# Patient Record
Sex: MALE | Race: WHITE | URBAN - METROPOLITAN AREA
[De-identification: names, ages, dates, MRNs, and addresses within clinical notes are randomized per-mention and may not be internally consistent; named-entity substitution may affect disease eponyms.]

---

## 2022-08-31 ENCOUNTER — HOSPITAL ENCOUNTER (OUTPATIENT)
Dept: INFUSION THERAPY | Age: 72
Discharge: HOME | End: 2022-08-31
Payer: COMMERCIAL

## 2022-08-31 ENCOUNTER — INITIAL CONSULT (OUTPATIENT)
Dept: ONCOLOGY | Age: 72
End: 2022-08-31
Payer: COMMERCIAL

## 2022-08-31 VITALS
TEMPERATURE: 97.8 F | HEIGHT: 72 IN | WEIGHT: 191.8 LBS | SYSTOLIC BLOOD PRESSURE: 130 MMHG | HEART RATE: 74 BPM | DIASTOLIC BLOOD PRESSURE: 75 MMHG | BODY MASS INDEX: 25.98 KG/M2 | RESPIRATION RATE: 16 BRPM | OXYGEN SATURATION: 97 %

## 2022-08-31 DIAGNOSIS — C61 PROSTATE CANCER METASTATIC TO BONE (HCC): ICD-10-CM

## 2022-08-31 DIAGNOSIS — C79.51 PROSTATE CANCER METASTATIC TO BONE (HCC): ICD-10-CM

## 2022-08-31 PROCEDURE — 99205 OFFICE O/P NEW HI 60 MIN: CPT | Performed by: INTERNAL MEDICINE

## 2022-08-31 PROCEDURE — 1123F ACP DISCUSS/DSCN MKR DOCD: CPT | Performed by: INTERNAL MEDICINE

## 2022-08-31 PROCEDURE — 99211 OFF/OP EST MAY X REQ PHY/QHP: CPT

## 2022-08-31 RX ORDER — AMLODIPINE BESYLATE 10 MG/1
10 TABLET ORAL DAILY
COMMUNITY
Start: 2022-02-18

## 2022-08-31 RX ORDER — LORATADINE 10 MG/1
10 TABLET ORAL DAILY
COMMUNITY
Start: 2022-03-18

## 2022-08-31 RX ORDER — FLUTICASONE PROPIONATE AND SALMETEROL 250; 50 UG/1; UG/1
POWDER RESPIRATORY (INHALATION)
COMMUNITY
Start: 2022-08-19

## 2022-08-31 RX ORDER — TIZANIDINE 4 MG/1
TABLET ORAL
COMMUNITY
Start: 2022-05-23

## 2022-08-31 RX ORDER — BICALUTAMIDE 50 MG/1
TABLET, FILM COATED ORAL
COMMUNITY
Start: 2022-08-19

## 2022-08-31 RX ORDER — PRENATAL VIT 91/IRON/FOLIC/DHA 28-975-200
COMBINATION PACKAGE (EA) ORAL
COMMUNITY
Start: 2022-07-20

## 2022-08-31 RX ORDER — FINASTERIDE 5 MG/1
TABLET, FILM COATED ORAL
COMMUNITY
Start: 2022-08-19

## 2022-08-31 RX ORDER — ACETAMINOPHEN 500 MG
1000 TABLET ORAL EVERY 6 HOURS PRN
COMMUNITY
Start: 2015-07-12

## 2022-08-31 RX ORDER — METOPROLOL TARTRATE 50 MG/1
50 TABLET, FILM COATED ORAL 2 TIMES DAILY
COMMUNITY
Start: 2015-07-20

## 2022-08-31 RX ORDER — TERBINAFINE HYDROCHLORIDE 1 G/100G
CREAM TOPICAL
COMMUNITY
Start: 2022-07-20

## 2022-08-31 RX ORDER — TAMSULOSIN HYDROCHLORIDE 0.4 MG/1
CAPSULE ORAL
COMMUNITY
Start: 2022-07-20

## 2022-08-31 RX ORDER — ATORVASTATIN CALCIUM 40 MG/1
TABLET, FILM COATED ORAL
COMMUNITY
Start: 2022-08-19

## 2022-08-31 RX ORDER — NYSTATIN 100000 [USP'U]/G
POWDER TOPICAL
COMMUNITY
Start: 2022-07-20

## 2022-08-31 RX ORDER — ALBUTEROL SULFATE 90 UG/1
AEROSOL, METERED RESPIRATORY (INHALATION)
COMMUNITY
Start: 2022-07-20

## 2022-08-31 ASSESSMENT — PATIENT HEALTH QUESTIONNAIRE - PHQ9
2. FEELING DOWN, DEPRESSED OR HOPELESS: 0
3. TROUBLE FALLING OR STAYING ASLEEP: 0
7. TROUBLE CONCENTRATING ON THINGS, SUCH AS READING THE NEWSPAPER OR WATCHING TELEVISION: 0
SUM OF ALL RESPONSES TO PHQ9 QUESTIONS 1 & 2: 0
4. FEELING TIRED OR HAVING LITTLE ENERGY: 0
9. THOUGHTS THAT YOU WOULD BE BETTER OFF DEAD, OR OF HURTING YOURSELF: 0
SUM OF ALL RESPONSES TO PHQ QUESTIONS 1-9: 0
SUM OF ALL RESPONSES TO PHQ QUESTIONS 1-9: 0
1. LITTLE INTEREST OR PLEASURE IN DOING THINGS: 0
8. MOVING OR SPEAKING SO SLOWLY THAT OTHER PEOPLE COULD HAVE NOTICED. OR THE OPPOSITE, BEING SO FIGETY OR RESTLESS THAT YOU HAVE BEEN MOVING AROUND A LOT MORE THAN USUAL: 0
SUM OF ALL RESPONSES TO PHQ QUESTIONS 1-9: 0
5. POOR APPETITE OR OVEREATING: 0
SUM OF ALL RESPONSES TO PHQ QUESTIONS 1-9: 0
6. FEELING BAD ABOUT YOURSELF - OR THAT YOU ARE A FAILURE OR HAVE LET YOURSELF OR YOUR FAMILY DOWN: 0
10. IF YOU CHECKED OFF ANY PROBLEMS, HOW DIFFICULT HAVE THESE PROBLEMS MADE IT FOR YOU TO DO YOUR WORK, TAKE CARE OF THINGS AT HOME, OR GET ALONG WITH OTHER PEOPLE: 0

## 2022-08-31 NOTE — PROGRESS NOTES
MA Rooming Questions  Patient: Caryn Duke  MRN: [id number]    Date: 8/31/2022      New patient          PHQ-9 Total Score: 0 (8/31/2022  1:44 PM)  Thoughts that you would be better off dead, or of hurting yourself in some way: Not at all (8/31/2022  1:44 PM)       PHQ-9 Given to (if applicable):               PHQ-9 Score (if applicable):                     [] Positive     []  Negative              Does question #9 need addressed (if applicable)                     [] Yes    []  No               Mariaelena Garcia CMA

## 2022-08-31 NOTE — PROGRESS NOTES
Patient Name:  Starla Long  Patient :  1950  Patient MRN:  [id number]     Primary Oncologist: Michael Ha MD  Referring Provider: Teo Cuadra MD     Date of Service: 2022      Reason for Consult: To evaluate the patient with metastatic castration sensitive prostate cancer. Chief Complaint:    Chief Complaint   Patient presents with    New Patient     Patient's active problem list:        Metastatic prostate cancer    HPI:   Starla Long is a 44-year-old very pleasant gentleman with medical history significant for hypertension, hyperlipidemia, COPD, GERD, osteoarthritis and history of kidney stone, referred to me on 2022 for evaluation of metastatic prostate cancer. He initially presented to [facility] on 22 with abdominal pain and PVR was about 9 liter. CT scan on 22 showed cystitis, b/l moderate to severe HN, b/l renal stones. All the renal stones are non obstructive and his ARF resolved with koch catheter. He was also noted to have significant elevation in PSA (331 ng/ml on 22). It was 150 on 2022 and 5 on 2019. RACHEL at that time showed rock hard large prostate. MRI pelvis showed diffuse tumor involvement of the prostate gland, with loss of zonal anatomy, extraprostatic extension and involvement of the seminal vesicles and suspected involvement of the neurovascular bundles, PI-RADS 5. Enlarged pelvic and retroperitoneal lymph nodes consistent with willow metastasis. A larger right common iliac chain lymph node results in mass-effect on the right common iliac vein. Early mucosa enhancement of the urinary bladder. Differential includes a cystitis although involvement of the urinary bladder is not excluded given extensive involvement of the prostate gland. He has right lower extremity swelling. Doppler study was negative for DVT and Echo showed normal EF. RLE swelling is most likely due to mass effect from enlarged lymph node to right common iliac vein. Bone scan done on 8/25/2022 showed findings concerning for metastatic disease within the T10 vertebral body. Findings consistent with degenerative changes in the spine and multiple joints and possibly an old fracture in the left rib cage. He is scheduled for TURP to get tissue diagnosis and to release urine outflow obstruction on 10/13/22. Dr. Jossue Kathleen started him on proscar and casodex on 8/19/22. He was referred to me for evaluation. Past Medical History:     Significant for  1. Hypertension  2. Hyperlipidemia  3. COPD  4. GERD  5. Osteoarthritis  6. History of kidney stone    Past Surgery History:    Significant for  1. Laparoscopic cholecystectomy  2. Right leg surgery  3. Tonsillectomy                                                                          4.  Endoscopy with dilatation    Social History:   He is a former smoker and he quit smoking in 7/10/2015. He used to smoke 2 packs a day for approximately 45 years. He denies alcohol drinking or illicit drug abuse. Family History:    Significant for COPD and CHF in his mother.     No Known Allergies    Current Outpatient Medications on File Prior to Visit   Medication Sig Dispense Refill    acetaminophen (TYLENOL) 500 MG tablet Take 1,000 mg by mouth every 6 hours as needed      bicalutamide (CASODEX) 50 MG chemo tablet TAKE 1 TABLET BY MOUTH EVERY DAY      amLODIPine (NORVASC) 10 MG tablet Take 10 mg by mouth daily      atorvastatin (LIPITOR) 40 MG tablet TAKE 1 TABLET BY MOUTH EVERY DAY      metoprolol tartrate (LOPRESSOR) 50 MG tablet Take 50 mg by mouth 2 times daily      bisacodyl (DULCOLAX) 5 MG EC tablet Take 10 mg by mouth daily as needed      finasteride (PROSCAR) 5 MG tablet TAKE 1 TABLET BY MOUTH EVERY DAY      tamsulosin (FLOMAX) 0.4 MG capsule TAKE 1 CAPSULE BY MOUTH EVERY DAY      tiZANidine (ZANAFLEX) 4 MG tablet TAKE 1 TABLET BY MOUTH TWICE A DAY      albuterol sulfate HFA (PROVENTIL;VENTOLIN;PROAIR) 108 (90 Base) MCG/ACT inhaler INHALE 2 PUFFS AS DIRECTED EVERY 6 HOURS AS NEEDED FOR SHORTNESS OF BREATH      WIXELA INHUB 250-50 MCG/ACT AEPB diskus inhaler INHALE 1 PUFF AS DIRECTED TWO TIMES A DAY      loratadine (CLARITIN) 10 MG tablet Take 10 mg by mouth daily      hydrocortisone 2.5 % cream Apply topically      nystatin (MYCOSTATIN) 640083 UNIT/GM powder APPLY TOPICALLY 2 TIMES A DAY FOR 13 DAYS. [employer] ATHLETES FOOT 1 % cream APPLY TO AFFECTED AREA TWICE A DAY FOR 14 DAYS      terbinafine (LAMISIL) 1 % cream APPLY TO AFFECTED AREA TWICE A DAY FOR 14 DAYS       No current facility-administered medications on file prior to visit. Review of Systems:  Constitutional:  No weight loss, No fever, No chills, No night sweats. Energy level is okay. Eyes:  No diplopia, No transient or permanent loss of vision, No scotomata. ENT / Mouth:  No epistaxis, No dysphagia, No hoarseness, No oral ulcers, No gingival bleeding. No sore throat, No postnasal drip, No nasal drip, No mouth pain, No sinus pain, No tinnitus, Normal hearing. Cardiovascular:  No chest pain, No palpitations, No syncope, No upper extremity edema, No lower extremity edema, No calf discomfort. Respiratory:  No cough. No hemoptysis, No pleurisy, No wheezing, No dyspnea. Gastrointestinal:  No abdominal pain, No abdominal cramping, No nausea, No vomiting, No constipation, No diarrhea, No hematochezia, No melena, No jaundice, No dyspepsia, No dysphagia. Urinary:  No dysuria, No hematuria, No urinary incontinence. Musculoskeletal:  No muscle pain, No swollen joints, No joint redness, Has back pain,  Skin:  No rash, No nodules, No pruritus, No lesions. Neurologic:  No confusion, No seizures, No syncope, No tremor, No speech change, No headache, No hiccups, No abnormal gait, No sensory changes, No weakness.   Psychiatric:  No depression, No anxiety, Concentration normal.  Endocrine:  No polyuria, No polydipsia, No hot flashes, No thyroid symptoms. Hematologic:  No epistaxis, No gingival bleeding, No petechiae, No ecchymosis. Lymphatic:  RLL lymphedema +. Allergy / Immunologic:  No eczema, No frequent mucous infections, No frequent respiratory infections, No recurrent urticarial, No frequent skin infections. Vital Signs: /75 (Site: Left Upper Arm, Position: Sitting, Cuff Size: Medium Adult)   Pulse 74   Temp 97.8 °F (36.6 °C) (Infrared)   Resp 16   Ht 6' (1.829 m)   Wt 191 lb 12.8 oz (87 kg)   SpO2 97%   BMI 26.01 kg/m²      Physical Exam:  CONSTITUTIONAL: awake, alert, cooperative, no apparent distress   EYES: pupils equal, round and reactive to light, sclera clear, normal conjunctiva  ENT: Normocephalic, without obvious abnormality, atraumatic  NECK: supple, symmetrical, no jugular venous distension, no carotid bruits   HEMATOLOGIC/LYMPHATIC: no cervical, supraclavicular or axillary lymphadenopathy   LUNGS: VBS, no wheezes, no increased work of breathing, no rhonchi, clear to auscultation, no crackles,    CARDIOVASCULAR: regular rate and rhythm, normal S1 and S2, no murmur noted  ABDOMEN: normal bowel sounds x 4, soft, non-distended, non-tender, no masses palpated, no hepatosplenomegaly   MUSCULOSKELETAL: full range of motion noted, tone is normal  NEUROLOGIC: awake, alert, oriented to name, place and time. Motor skills grossly intact. SKIN: appears intact, normal skin color, normal texture, normal turgor, no jaundice.    EXTREMITIES: RLE swelling +, no cyanosis, no clubbing,       Labs:  Hematology:  No results found for: WBC, RBC, HGB, HCT, MCV, MCH, MCHC, RDW, PLT, MPV, BANDSPCT, SEGSPCT, EOSRELPCT, BASOPCT, LYMPHOPCT, MONOPCT, BANDABS, SEGSABS, EOSABS, BASOSABS, LYMPHSABS, MONOSABS, DIFFTYPE, ANISOCYTOSIS, POLYCHROM, WBCMORP, PLTM  No results found for: ESR  Chemistry:  No results found for: NA, K, CL, CO2, BUN, CREATININE, GLUCOSE, CALCIUM, PROT, LABALBU, BILITOT, ALKPHOS, AST, ALT, LABGLOM, GFRAA, AGRATIO, GLOB, PHOS, MG, POCCA, POCGLU  No results found for: MMA, LDH, HOMOCYSTEINE  No components found for: LD  No results found for: TSHHS, T4FREE, FT3  Immunology:  No results found for: PROT, SPEP, ALBUMINELP, LABALPH, LABBETA, GAMGLOB  No results found for: Monica [place], KLFLCR  No results found for: B2M  Coagulation Panel:  No results found for: PROTIME, INR, APTT, DDIMER  Anemia Panel:  No results found for: [id number], FOLATE  Tumor Markers:  No results found for: , CEA, , LABCA2, PSA     Observations:  PHQ-9 Total Score: 0 (8/31/2022  1:44 PM)  Thoughts that you would be better off dead, or of hurting yourself in some way: Not at all (8/31/2022  1:44 PM)     Assessment   Castration naive metastatic prostate cancer    Plan:  Hayley Pisano is a 79-year-old very pleasant gentleman who initially presented to [facility] with urinary outlet obstruction. Further work up showed that he has rock hard large prostate on RACHEL with significantly elevated PSA (331). MRI showed multiple abdominal and pelvic lymphadenopathy. One of the right pelvic lymph node has mass effect of right common iliac vein and causing him significant right leg swelling. Bone scan showed metastatic disease in T10 vertebra body. He is scheduled for TURP to get tissue diagnosis and to release urine outflow obstruction on 10/13/22. Dr. Claus White started him on proscar and casodex on 8/19/22. I reviewed his scans (CT scans, MRI and bone scans) and labs. Clinically, he has castration naive metastatic prostate cancer. I discussed his case with Dr. Claus White briefly. I recommend him to have TURP as planned on 10/13/22. I discussed with him about management of castration naive metastatic prostate cancer, including   (A) ADT alone   (B) ADT with one of the following (Docetaxel; Abiraterone; Enzalutamide; or Apalutamide). (C) We also discussed about EBRT to primary tumor for low volume M1 disease.      I discussed with him about both surgical as well as chemical castration (ADT). He will discuss more about ADT with Dr. Elif Nguyen on his next office visit. I will see him back after biopsy and will plan to start either docetaxel or one of the oral drug at that time. I answered all his questions and concerns for today. I asked him to follow up with primary care physician on regular basis. I will continue to keep you updated on his progress. Thank you for allowing me to participate in the care of this very pleasant patient. Recent imaging and labs were reviewed and discussed with the patient.

## 2022-10-27 ENCOUNTER — OFFICE VISIT (OUTPATIENT)
Dept: ONCOLOGY | Age: 72
End: 2022-10-27
Payer: COMMERCIAL

## 2022-10-27 ENCOUNTER — HOSPITAL ENCOUNTER (OUTPATIENT)
Dept: INFUSION THERAPY | Age: 72
Discharge: HOME | End: 2022-10-27
Payer: COMMERCIAL

## 2022-10-27 VITALS
HEIGHT: 72 IN | DIASTOLIC BLOOD PRESSURE: 75 MMHG | WEIGHT: 198.8 LBS | HEART RATE: 91 BPM | TEMPERATURE: 97.6 F | SYSTOLIC BLOOD PRESSURE: 157 MMHG | OXYGEN SATURATION: 98 % | BODY MASS INDEX: 26.93 KG/M2

## 2022-10-27 DIAGNOSIS — C79.51 PROSTATE CANCER METASTATIC TO BONE (HCC): ICD-10-CM

## 2022-10-27 DIAGNOSIS — C61 PROSTATE CANCER METASTATIC TO BONE (HCC): ICD-10-CM

## 2022-10-27 DIAGNOSIS — C79.51 PROSTATE CANCER METASTATIC TO BONE (HCC): Primary | ICD-10-CM

## 2022-10-27 DIAGNOSIS — C61 PROSTATE CANCER METASTATIC TO BONE (HCC): Primary | ICD-10-CM

## 2022-10-27 LAB
ALBUMIN SERPL-MCNC: 4.1 GM/DL (ref 3.4–5)
ALP BLD-CCNC: 201 IU/L (ref 40–128)
ALT SERPL-CCNC: 11 U/L (ref 10–40)
ANION GAP SERPL CALCULATED.3IONS-SCNC: 12 MMOL/L (ref 4–16)
AST SERPL-CCNC: 12 IU/L (ref 15–37)
BASOPHILS ABSOLUTE: 0 K/CU MM
BASOPHILS RELATIVE PERCENT: 0.3 % (ref 0–1)
BILIRUB SERPL-MCNC: 0.6 MG/DL (ref 0–1)
BUN BLDV-MCNC: 15 MG/DL (ref 6–23)
CALCIUM SERPL-MCNC: 9.9 MG/DL (ref 8.3–10.6)
CHLORIDE BLD-SCNC: 104 MMOL/L (ref 99–110)
CO2: 23 MMOL/L (ref 21–32)
CREAT SERPL-MCNC: 0.9 MG/DL (ref 0.9–1.3)
DIFFERENTIAL TYPE: ABNORMAL
EOSINOPHILS ABSOLUTE: 0.2 K/CU MM
EOSINOPHILS RELATIVE PERCENT: 2.3 % (ref 0–3)
GFR SERPL CREATININE-BSD FRML MDRD: >60 ML/MIN/1.73M2
GLUCOSE BLD-MCNC: 106 MG/DL (ref 70–99)
HCT VFR BLD CALC: 42.6 % (ref 42–52)
HEMOGLOBIN: 13.9 GM/DL (ref 13.5–18)
LYMPHOCYTES ABSOLUTE: 1.3 K/CU MM
LYMPHOCYTES RELATIVE PERCENT: 17.3 % (ref 24–44)
MCH RBC QN AUTO: 27.5 PG (ref 27–31)
MCHC RBC AUTO-ENTMCNC: 32.6 % (ref 32–36)
MCV RBC AUTO: 84.4 FL (ref 78–100)
MONOCYTES ABSOLUTE: 0.8 K/CU MM
MONOCYTES RELATIVE PERCENT: 10.2 % (ref 0–4)
PDW BLD-RTO: 15.7 % (ref 11.7–14.9)
PLATELET # BLD: 255 K/CU MM (ref 140–440)
PMV BLD AUTO: 10.2 FL (ref 7.5–11.1)
POTASSIUM SERPL-SCNC: 4.2 MMOL/L (ref 3.5–5.1)
RBC # BLD: 5.05 M/CU MM (ref 4.6–6.2)
SEGMENTED NEUTROPHILS ABSOLUTE COUNT: 5.4 K/CU MM
SEGMENTED NEUTROPHILS RELATIVE PERCENT: 69.9 % (ref 36–66)
SODIUM BLD-SCNC: 139 MMOL/L (ref 135–145)
TOTAL PROTEIN: 6.5 GM/DL (ref 6.4–8.2)
WBC # BLD: 7.7 K/CU MM (ref 4–10.5)

## 2022-10-27 PROCEDURE — 85025 COMPLETE CBC W/AUTO DIFF WBC: CPT

## 2022-10-27 PROCEDURE — 84153 ASSAY OF PSA TOTAL: CPT

## 2022-10-27 PROCEDURE — 1123F ACP DISCUSS/DSCN MKR DOCD: CPT | Performed by: INTERNAL MEDICINE

## 2022-10-27 PROCEDURE — 84154 ASSAY OF PSA FREE: CPT

## 2022-10-27 PROCEDURE — 99215 OFFICE O/P EST HI 40 MIN: CPT | Performed by: INTERNAL MEDICINE

## 2022-10-27 PROCEDURE — 80053 COMPREHEN METABOLIC PANEL: CPT

## 2022-10-27 PROCEDURE — 36415 COLL VENOUS BLD VENIPUNCTURE: CPT

## 2022-10-27 PROCEDURE — 99211 OFF/OP EST MAY X REQ PHY/QHP: CPT

## 2022-10-27 NOTE — PROGRESS NOTES
MA Rooming Questions  Patient: Twila Daily  MRN: [id number]    Date: 10/27/2022        1. Do you have any new issues?   no         2. Do you need any refills on medications?    no    3. Have you had any imaging done since your last visit?   no    4. Have you been hospitalized or seen in the emergency room since your last visit here?   no    5. Did the patient have a depression screening completed today?  No    No data recorded     PHQ-9 Given to (if applicable):               PHQ-9 Score (if applicable):                     [] Positive     []  Negative              Does question #9 need addressed (if applicable)                     [] Yes    []  No               Clark Cisneros CMA

## 2022-10-27 NOTE — LETTER
[address] [place] [address] [address] [place] [facility] [address]  Phone: [phone]  Fax: [phone]           Jeff Guzman MD      October 28, 2022     Patient: Debra Shoulder   MR Number: [id number]   YOB: 1950   Date of Visit: 10/27/2022       Dear Dr. Felix Beams ref. provider found: Thank you for referring Saroj Taveras to me for evaluation/treatment. Below are the relevant portions of my assessment and plan of care. If you have questions, please do not hesitate to call me. I look forward to following Mainor Gonzalezs along with you.     Sincerely,        Jeff Guzman MD    CC providers:  MD Bernie Armstrong  [address].  [address] Valerio Henry MD  [address] [place] [id number]  Via Fax: [phone]

## 2022-10-27 NOTE — PROGRESS NOTES
Patient Name:  Colten Prather  Patient :  1950  Patient MRN:  [id number]     Primary Oncologist: Gely Olson MD  Referring Provider: Alfredito Duron MD     Date of Service: 10/27/2022     Chief Complaint:    Chief Complaint   Patient presents with    Follow-up     Patient's active problem list:        Metastatic prostate cancer    HPI:   Colten Prather is a 74-year-old very pleasant gentleman with medical history significant for hypertension, hyperlipidemia, COPD, GERD, osteoarthritis and history of kidney stone, referred to me on 2022 for evaluation of metastatic prostate cancer. He initially presented to [facility] on 22 with abdominal pain and PVR was about 9 liter. CT scan on 22 showed cystitis, b/l moderate to severe HN, b/l renal stones. All the renal stones are non obstructive and his ARF resolved with koch catheter. He was also noted to have significant elevation in PSA (331 ng/ml on 22). It was 150 on 2022 and 5 on 2019. RACHEL at that time showed rock hard large prostate. MRI pelvis showed diffuse tumor involvement of the prostate gland, with loss of zonal anatomy, extraprostatic extension and involvement of the seminal vesicles and suspected involvement of the neurovascular bundles, PI-RADS 5. Enlarged pelvic and retroperitoneal lymph nodes consistent with willow metastasis. A larger right common iliac chain lymph node results in mass-effect on the right common iliac vein. Early mucosa enhancement of the urinary bladder. Differential includes a cystitis although involvement of the urinary bladder is not excluded given extensive involvement of the prostate gland. He has right lower extremity swelling. Doppler study was negative for DVT and Echo showed normal EF. RLE swelling is most likely due to mass effect from enlarged lymph node to right common iliac vein.      Bone scan done on 2022 showed findings concerning for metastatic disease within the T10 vertebral body. Findings consistent with degenerative changes in the spine and multiple joints and possibly an old fracture in the left rib cage. He is scheduled for TURP to get tissue diagnosis and to release urine outflow obstruction on 10/13/22. Dr. Ramón Nicolas started him on proscar and casodex on 8/19/22. He underwent transurethral resection of prostate cancer on 10/13/22 and pathology showed prostate adenocarcinoma, fabi score 4+4=8, (grade group 4, 12/15 cores, involving approximately 30% of tissue present. On October 27, 2022, he presented to me for follow-up. On today's visit, I reviewed with him findings on final pathology. Clinically, he has castration sensitive prostate cancer, grade group 4 and I reviewed with him all the treatment options. I recommend ADT with docetaxel and abiraterone. Reviewed with him all the potential side effects as well as benefits of the therapy. After long discussion, he is in agreement with the plans. Will set up for chemo education and we will plan to start docetaxel and abiraterone in next few weeks. Discussed his case with Dr. Ramón Nicolas. Dr. Ramón Nicolas will initiate ADT soon. I will continue to follow him closely during chemotherapy and abiraterone. He stated that he is feeling some improvement after TURP. He doesn't have any new complaints. Past Medical History:     Significant for  1. Hypertension  2. Hyperlipidemia  3. COPD  4. GERD  5. Osteoarthritis  6. History of kidney stone    Past Surgery History:    Significant for  1. Laparoscopic cholecystectomy  2. Right leg surgery  3. Tonsillectomy                                                                          4.  Endoscopy with dilatation    Social History:   He is a former smoker and he quit smoking in 7/10/2015. He used to smoke 2 packs a day for approximately 45 years. He denies alcohol drinking or illicit drug abuse.     Family History:    Significant for COPD and CHF in his mother. No Known Allergies    Review of Systems: \"Per interval history; otherwise 10 point ROS is negative. \"  His energy level is pretty good and his sleep is fine. He doesn't have fever, chills, night sweats, cough, shortness of breath, chest pain, hemoptysis or palpitations. His bowel and bladder functions are normal. He doesn't have nausea, vomiting, abdominal pain, diarrhea, constipation, dysuria, loss of appetite or weight loss. He doesn't have neuropathy and he denies bleeding or clotting issues. He doesn't have any pain in his body. No anxiety or depression. The rest of the systems are unremarkable. Vital Signs: BP (!) 157/75 (Site: Right Upper Arm, Position: Sitting, Cuff Size: Medium Adult)   Pulse 91   Temp 97.6 °F (36.4 °C) (Infrared)   Ht 6' (1.829 m)   Wt 198 lb 12.8 oz (90.2 kg)   SpO2 98%   BMI 26.96 kg/m²      Physical Exam:  CONSTITUTIONAL: awake, alert, cooperative, no apparent distress   EYES: pupils equal, round and reactive to light, sclera clear, normal conjunctiva  ENT: Normocephalic, without obvious abnormality, atraumatic  NECK: supple, symmetrical, no jugular venous distension, no carotid bruits   HEMATOLOGIC/LYMPHATIC: no cervical, supraclavicular or axillary lymphadenopathy   LUNGS: VBS, no wheezes, no increased work of breathing, no rhonchi, clear to auscultation, no crackles,    CARDIOVASCULAR: regular rate and rhythm, normal S1 and S2, no murmur noted  ABDOMEN: normal bowel sounds x 4, soft, non-distended, non-tender, no masses palpated, no hepatosplenomegaly   MUSCULOSKELETAL: full range of motion noted, tone is normal  NEUROLOGIC: awake, alert, oriented to name, place and time. Motor skills grossly intact. SKIN: appears intact, normal skin color, normal texture, normal turgor, no jaundice.    EXTREMITIES: RLE swelling +, no clubbing, no cyanosis,        Labs:  Hematology:  Lab Results   Component Value Date    WBC 7.7 10/27/2022    RBC 5.05 10/27/2022 HGB 13.9 10/27/2022    HCT 42.6 10/27/2022    MCV 84.4 10/27/2022    MCH 27.5 10/27/2022    MCHC 32.6 10/27/2022    RDW 15.7 (H) 10/27/2022     10/27/2022    MPV 10.2 10/27/2022    SEGSPCT 69.9 (H) 10/27/2022    EOSRELPCT 2.3 10/27/2022    BASOPCT 0.3 10/27/2022    LYMPHOPCT 17.3 (L) 10/27/2022    MONOPCT 10.2 (H) 10/27/2022    SEGSABS 5.4 10/27/2022    EOSABS 0.2 10/27/2022    BASOSABS 0.0 10/27/2022    LYMPHSABS 1.3 10/27/2022    MONOSABS 0.8 10/27/2022    DIFFTYPE AUTOMATED DIFFERENTIAL 10/27/2022     No results found for: ESR  Chemistry:  Lab Results   Component Value Date     10/27/2022    K 4.2 10/27/2022     10/27/2022    CO2 23 10/27/2022    BUN 15 10/27/2022    CREATININE 0.9 10/27/2022    GLUCOSE 106 (H) 10/27/2022    CALCIUM 9.9 10/27/2022    PROT 6.5 10/27/2022    LABALBU 4.1 10/27/2022    BILITOT 0.6 10/27/2022    ALKPHOS 201 (H) 10/27/2022    AST 12 (L) 10/27/2022    ALT 11 10/27/2022    LABGLOM >60 10/27/2022     No results found for: MMA, LDH, HOMOCYSTEINE  No components found for: LD  No results found for: TSHHS, T4FREE, FT3  Immunology:  Lab Results   Component Value Date    PROT 6.5 10/27/2022     No results found for: VETO SalazarLCR  No results found for: B2M  Coagulation Panel:  No results found for: PROTIME, INR, APTT, DDIMER  Anemia Panel:  No results found for: [id number], FOLATE  Tumor Markers:  No results found for: , CEA, , LABCA2, PSA     Observations:  No data recorded     Assessment   Castration naive metastatic prostate cancer    Plan:  Cole Gomez is a 27-year-old very pleasant gentleman who initially presented to [facility] with urinary outlet obstruction. Further work up showed that he has rock hard large prostate on RACHEL with significantly elevated PSA (331). MRI showed multiple abdominal and pelvic lymphadenopathy.  One of the right pelvic lymph node has mass effect of right common iliac vein and causing him significant right leg swelling. Bone scan showed metastatic disease in T10 vertebra body. He is scheduled for TURP to get tissue diagnosis and to release urine outflow obstruction on 10/13/22. Dr. Donny Mckeon started him on proscar and casodex on 8/19/22. He underwent transurethral resection of prostate cancer on 10/13/22 and pathology showed prostate adenocarcinoma, fabi score 4+4=8, (grade group 4, 12/15 cores, involving approximately 30% of tissue present. On October 27, 2022, he presented to me for follow-up. On today's visit, I reviewed with him findings on final pathology. Clinically, he has castration sensitive prostate cancer, grade group 4 and I reviewed with him all the treatment options. I recommend ADT with docetaxel and abiraterone. Reviewed with him all the potential side effects as well as benefits of the therapy. After long discussion, he is in agreement with the plans. Will set up for chemo education and we will plan to start docetaxel and abiraterone in next few weeks. Discussed his case with Dr. Donny Mckeon. Dr. Donny Mckeon will initiate ADT soon. I will continue to follow him closely during chemotherapy and abiraterone. I answered all his questions and concerns for today. Recent imaging and labs were reviewed and discussed with the patient.

## 2022-10-28 DIAGNOSIS — C79.51 PROSTATE CANCER METASTATIC TO BONE (HCC): Primary | ICD-10-CM

## 2022-10-28 DIAGNOSIS — C61 PROSTATE CANCER METASTATIC TO BONE (HCC): Primary | ICD-10-CM

## 2022-10-28 LAB
PROSTATE SPECIFIC ANTIGEN FREE: 21.4 NG/ML
PROSTATE SPECIFIC ANTIGEN PERCENT FREE: 34 %
PROSTATE SPECIFIC ANTIGEN: 63.7 NG/ML (ref 0–4)

## 2022-10-28 RX ORDER — SODIUM CHLORIDE 9 MG/ML
5-250 INJECTION, SOLUTION INTRAVENOUS PRN
OUTPATIENT
Start: 2022-12-05

## 2022-10-28 RX ORDER — ABIRATERONE 500 MG/1
1000 TABLET ORAL DAILY
Qty: 30 TABLET | Refills: 5 | Status: ACTIVE | OUTPATIENT
Start: 2022-10-28 | End: 2022-11-01 | Stop reason: SDUPTHER

## 2022-10-28 RX ORDER — ALBUTEROL SULFATE 90 UG/1
4 AEROSOL, METERED RESPIRATORY (INHALATION) PRN
OUTPATIENT
Start: 2022-12-05

## 2022-10-28 RX ORDER — FAMOTIDINE 10 MG/ML
20 INJECTION, SOLUTION INTRAVENOUS
OUTPATIENT
Start: 2022-12-05

## 2022-10-28 RX ORDER — ACETAMINOPHEN 325 MG/1
650 TABLET ORAL
OUTPATIENT
Start: 2022-12-05

## 2022-10-28 RX ORDER — PREDNISONE 1 MG/1
5 TABLET ORAL 2 TIMES DAILY
Qty: 60 TABLET | Refills: 5 | Status: SHIPPED | OUTPATIENT
Start: 2022-10-28

## 2022-10-28 RX ORDER — EPINEPHRINE 1 MG/ML
0.3 INJECTION, SOLUTION, CONCENTRATE INTRAVENOUS PRN
OUTPATIENT
Start: 2022-12-05

## 2022-10-28 RX ORDER — SODIUM CHLORIDE 0.9 % (FLUSH) 0.9 %
5-40 SYRINGE (ML) INJECTION PRN
OUTPATIENT
Start: 2022-12-05

## 2022-10-28 RX ORDER — DIPHENHYDRAMINE HYDROCHLORIDE 50 MG/ML
50 INJECTION INTRAMUSCULAR; INTRAVENOUS
OUTPATIENT
Start: 2022-12-05

## 2022-10-28 RX ORDER — MEPERIDINE HYDROCHLORIDE 50 MG/ML
12.5 INJECTION INTRAMUSCULAR; INTRAVENOUS; SUBCUTANEOUS PRN
OUTPATIENT
Start: 2022-12-05

## 2022-10-28 RX ORDER — SODIUM CHLORIDE 9 MG/ML
5-40 INJECTION INTRAVENOUS PRN
OUTPATIENT
Start: 2022-12-05

## 2022-10-28 RX ORDER — SODIUM CHLORIDE 9 MG/ML
INJECTION, SOLUTION INTRAVENOUS CONTINUOUS
OUTPATIENT
Start: 2022-12-05

## 2022-10-28 RX ORDER — ONDANSETRON 2 MG/ML
8 INJECTION INTRAMUSCULAR; INTRAVENOUS
OUTPATIENT
Start: 2022-12-05

## 2022-10-28 RX ORDER — HEPARIN SODIUM (PORCINE) LOCK FLUSH IV SOLN 100 UNIT/ML 100 UNIT/ML
500 SOLUTION INTRAVENOUS PRN
OUTPATIENT
Start: 2022-12-05

## 2022-10-28 NOTE — PROGRESS NOTES
RX for zytiga 1,000 mg PO daily and prednisone 5 mg PO BID e-scribed to [address] [facility] [address]. NN referral placed per physician order.

## 2022-11-01 RX ORDER — ABIRATERONE 500 MG/1
1000 TABLET ORAL DAILY
Qty: 60 TABLET | Refills: 5 | Status: ACTIVE | OUTPATIENT
Start: 2022-11-01 | End: 2022-11-16

## 2022-11-01 NOTE — PROGRESS NOTES
[facility]    Date: 11/01/22    Medication is currently being filled at [facility] and has been routed there. Prior authorization effective through 10/31/23. Please call us with any questions at [phone] opt.  2.

## 2022-11-01 NOTE — PROGRESS NOTES
Received VM from [facility] regarding abiraterone (zytiga) 500 mg tablets prescription. QTY updated to 60 (patient to take 2 tablets PO daily). New RX e-scribed to pharmacy. Called [facility] @ [phone] to notify. [facility] voices understanding.

## 2022-11-02 ENCOUNTER — CLINICAL DOCUMENTATION (OUTPATIENT)
Dept: ONCOLOGY | Age: 72
End: 2022-11-02

## 2022-11-02 NOTE — PROGRESS NOTES
This nurse called the patient @ [phone] to follow up on delivery of medication (Zytiga) and to advise him once insurance approves, he will be scheduled for chemo education.  Patient verbalized undertanding

## 2022-11-16 ENCOUNTER — CLINICAL DOCUMENTATION (OUTPATIENT)
Dept: ONCOLOGY | Age: 72
End: 2022-11-16

## 2022-11-16 RX ORDER — ABIRATERONE 500 MG/1
1000 TABLET ORAL DAILY
Qty: 60 TABLET | Refills: 5 | Status: ACTIVE | OUTPATIENT
Start: 2022-11-16

## 2022-11-16 NOTE — PROGRESS NOTES
Routing to [facility] per chart notes. High copay, no external foundation. [facility] able to help with copay. PA good thru 10/31/23.

## 2022-11-16 NOTE — PROGRESS NOTES
This nurse received a return call from the patient. This nurse updated the patient on the delays with medication and that his Elida Seed was be sent to [facility] for possible fill. All the patient's questions were answered and he denies further needs at this time.

## 2022-11-16 NOTE — PROGRESS NOTES
This nurse spoke with [facility] who report they may have assistance for the patient. This nurse rerouted prescription to [facility] as well as faxed Face sheet, PA and latest progress noted to [phone]. This called the patient @ [phone] however there was no answer. The patient's mailbox is also full and there is no option to leave a VM.

## 2022-11-19 NOTE — PROGRESS NOTES
Patient Name:  Nehal Renae  Patient :  1950  Patient MRN:  [id number]     Primary Oncologist: Gianluca Mcfarland MD  Referring Provider: Jia Pritchett MD     Date of Service: 2022     Chief Complaint:    Chief Complaint   Patient presents with    Follow-up     Patient's active problem list:        Metastatic prostate cancer    HPI:   Nehal Renae is a 62-year-old very pleasant gentleman with medical history significant for hypertension, hyperlipidemia, COPD, GERD, osteoarthritis and history of kidney stone, referred to me on 2022 for evaluation of metastatic prostate cancer. He initially presented to [facility] on 22 with abdominal pain and PVR was about 9 liter. CT scan on 22 showed cystitis, b/l moderate to severe HN, b/l renal stones. All the renal stones are non obstructive and his ARF resolved with koch catheter. He was also noted to have significant elevation in PSA (331 ng/ml on 22). It was 150 on 2022 and 5 on 2019. RACHEL at that time showed rock hard large prostate. MRI pelvis showed diffuse tumor involvement of the prostate gland, with loss of zonal anatomy, extraprostatic extension and involvement of the seminal vesicles and suspected involvement of the neurovascular bundles, PI-RADS 5. Enlarged pelvic and retroperitoneal lymph nodes consistent with willow metastasis. A larger right common iliac chain lymph node results in mass-effect on the right common iliac vein. Early mucosa enhancement of the urinary bladder. Differential includes a cystitis although involvement of the urinary bladder is not excluded given extensive involvement of the prostate gland. He has right lower extremity swelling. Doppler study was negative for DVT and Echo showed normal EF. RLE swelling is most likely due to mass effect from enlarged lymph node to right common iliac vein.      Bone scan done on 2022 showed findings concerning for metastatic disease within the T10 vertebral body. Findings consistent with degenerative changes in the spine and multiple joints and possibly an old fracture in the left rib cage. He is scheduled for TURP to get tissue diagnosis and to release urine outflow obstruction on 10/13/22. Dr. Adela Donovan started him on proscar and casodex on 8/19/22. He underwent transurethral resection of prostate cancer on 10/13/22 and pathology showed prostate adenocarcinoma, fabi score 4+4=8, (grade group 4, 12/15 cores, involving approximately 30% of tissue present. Dr. Adela Donovan started ADT with eligard since early November 2022. On November 21, 2022, he presented to me for follow-up. I have been following him for prostate cancer. Clinically, he has castration sensitive prostate cancer, grade group 4 and I reviewed with him all the treatment options. I recommend ADT with docetaxel and abiraterone. Reviewed with him all the potential side effects as well as benefits of the therapy. After long discussion, he is in agreement with the plans. I discussed his case with Dr. Adela Donovan and Pat [place] was started early November 2022. We already received authorization for docetaxel and will plan to start it soon. We are still waiting to get approval for abiraterone. Will set up for chemo education and we will plan to start docetaxel soon. I will continue to follow him closely during chemotherapy. He stated that he is feeling some improvement after TURP and ADT. He doesn't have any new complaints on today visit. Past Medical History:     Significant for  1. Hypertension  2. Hyperlipidemia  3. COPD  4. GERD  5. Osteoarthritis  6. History of kidney stone    Past Surgery History:    Significant for  1. Laparoscopic cholecystectomy  2. Right leg surgery  3.   Tonsillectomy                                                                          4.  Endoscopy with dilatation    Social History:   He is a former smoker and he quit smoking in 7/10/2015. He used to smoke 2 packs a day for approximately 45 years. He denies alcohol drinking or illicit drug abuse. Family History:    Significant for COPD and CHF in his mother. No Known Allergies    Review of Systems: \"Per interval history; otherwise 10 point ROS is negative. \"  His energy level is better and his sleep is fine. He doesn't have fever, chills, night sweats, cough, shortness of breath, chest pain, hemoptysis or palpitations. His bowel and bladder functions are normal. He doesn't have nausea, vomiting, abdominal pain, diarrhea, constipation, dysuria, loss of appetite or weight loss. He doesn't have neuropathy and he denies bleeding or clotting issues. He denies any pain in his body. No anxiety or depression. The rest of the systems are unremarkable. Vital Signs: /79 (Site: Right Upper Arm, Position: Sitting, Cuff Size: Medium Adult)   Pulse 82   Temp 97.8 °F (36.6 °C) (Temporal)   Ht 6' (1.829 m)   Wt 192 lb 12.8 oz (87.5 kg)   SpO2 98%   BMI 26.15 kg/m²      Physical Exam:  CONSTITUTIONAL: awake, alert, cooperative, no apparent distress   EYES: pupils equal, round and reactive to light, sclera clear, normal conjunctiva  ENT: Normocephalic, without obvious abnormality, atraumatic  NECK: supple, symmetrical, no jugular venous distension, no carotid bruits   HEMATOLOGIC/LYMPHATIC: no cervical, supraclavicular or axillary lymphadenopathy   LUNGS: VBS, no wheezes, no increased work of breathing, no rhonchi, clear to auscultation, no crackles,    CARDIOVASCULAR: regular rate and rhythm, normal S1 and S2, no murmur noted  ABDOMEN: normal bowel sounds x 4, soft, non-distended, non-tender, no masses palpated, no hepatosplenomegaly   MUSCULOSKELETAL: full range of motion noted, tone is normal  NEUROLOGIC: awake, alert, oriented to name, place and time. Motor skills grossly intact. SKIN: appears intact, normal skin color, normal texture, normal turgor, no jaundice. EXTREMITIES: no clubbing, RLE swelling +, no cyanosis,        Labs:  Hematology:  Lab Results   Component Value Date    WBC 7.7 10/27/2022    RBC 5.05 10/27/2022    HGB 13.9 10/27/2022    HCT 42.6 10/27/2022    MCV 84.4 10/27/2022    MCH 27.5 10/27/2022    MCHC 32.6 10/27/2022    RDW 15.7 (H) 10/27/2022     10/27/2022    MPV 10.2 10/27/2022    SEGSPCT 69.9 (H) 10/27/2022    EOSRELPCT 2.3 10/27/2022    BASOPCT 0.3 10/27/2022    LYMPHOPCT 17.3 (L) 10/27/2022    MONOPCT 10.2 (H) 10/27/2022    SEGSABS 5.4 10/27/2022    EOSABS 0.2 10/27/2022    BASOSABS 0.0 10/27/2022    LYMPHSABS 1.3 10/27/2022    MONOSABS 0.8 10/27/2022    DIFFTYPE AUTOMATED DIFFERENTIAL 10/27/2022     No results found for: ESR  Chemistry:  Lab Results   Component Value Date     10/27/2022    K 4.2 10/27/2022     10/27/2022    CO2 23 10/27/2022    BUN 15 10/27/2022    CREATININE 0.9 10/27/2022    GLUCOSE 106 (H) 10/27/2022    CALCIUM 9.9 10/27/2022    PROT 6.5 10/27/2022    LABALBU 4.1 10/27/2022    BILITOT 0.6 10/27/2022    ALKPHOS 201 (H) 10/27/2022    AST 12 (L) 10/27/2022    ALT 11 10/27/2022    LABGLOM >60 10/27/2022     No results found for: MMA, LDH, HOMOCYSTEINE  No components found for: LD  No results found for: TSHHS, T4FREE, FT3  Immunology:  Lab Results   Component Value Date    PROT 6.5 10/27/2022     No results found for: Nataliia Laughter, KLFLCR  No results found for: B2M  Coagulation Panel:  No results found for: PROTIME, INR, APTT, DDIMER  Anemia Panel:  No results found for: WNQNPPLY25, FOLATE  Tumor Markers:  Lab Results   Component Value Date    PSA 63.7 (H) 10/27/2022        Observations:  PHQ-9 Total Score: 0 (11/21/2022  1:13 PM)     Assessment   Castration naive metastatic prostate cancer    Plan:  Pau Murray is a 29-year-old very pleasant gentleman who initially presented to [facility] with urinary outlet obstruction.      Further work up showed that he has rock hard large prostate on RACHEL with significantly elevated PSA (331). MRI showed multiple abdominal and pelvic lymphadenopathy. One of the right pelvic lymph node has mass effect of right common iliac vein and causing him significant right leg swelling. Bone scan showed metastatic disease in T10 vertebra body. He is scheduled for TURP to get tissue diagnosis and to release urine outflow obstruction on 10/13/22. Dr. Justen Harris started him on proscar and casodex on 8/19/22. He underwent transurethral resection of prostate cancer on 10/13/22 and pathology showed prostate adenocarcinoma, fabi score 4+4=8, (grade group 4, 12/15 cores, involving approximately 30% of tissue present. Dr. Justen Harris started ADT with eligard since early November 2022. On November 21, 2022, he presented to me for follow-up. I have been following him for prostate cancer. Clinically, he has castration sensitive prostate cancer, grade group 4 and I reviewed with him all the treatment options. I recommend ADT with docetaxel and abiraterone. Reviewed with him all the potential side effects as well as benefits of the therapy. After long discussion, he is in agreement with the plans. I discussed his case with Dr. Justen Harris and Flordia Haste was started early November 2022. We already received authorization for docetaxel and will plan to start it soon. We are still waiting to get approval for abiraterone. Will set up for chemo education and we will plan to start docetaxel soon. I will continue to follow him closely during chemotherapy. He stated that he is feeling some improvement after TURP and ADT. I answered all his questions and concerns for today. Recent imaging and labs were reviewed and discussed with the patient.

## 2022-11-21 ENCOUNTER — CLINICAL DOCUMENTATION (OUTPATIENT)
Dept: ONCOLOGY | Age: 72
End: 2022-11-21

## 2022-11-21 ENCOUNTER — OFFICE VISIT (OUTPATIENT)
Dept: ONCOLOGY | Age: 72
End: 2022-11-21
Payer: COMMERCIAL

## 2022-11-21 ENCOUNTER — HOSPITAL ENCOUNTER (OUTPATIENT)
Dept: INFUSION THERAPY | Age: 72
Discharge: HOME | End: 2022-11-21
Payer: COMMERCIAL

## 2022-11-21 VITALS
HEART RATE: 82 BPM | HEIGHT: 72 IN | DIASTOLIC BLOOD PRESSURE: 79 MMHG | BODY MASS INDEX: 26.11 KG/M2 | SYSTOLIC BLOOD PRESSURE: 120 MMHG | WEIGHT: 192.8 LBS | TEMPERATURE: 97.8 F | OXYGEN SATURATION: 98 %

## 2022-11-21 DIAGNOSIS — C79.51 PROSTATE CANCER METASTATIC TO BONE (HCC): Primary | ICD-10-CM

## 2022-11-21 DIAGNOSIS — C61 PROSTATE CANCER METASTATIC TO BONE (HCC): Primary | ICD-10-CM

## 2022-11-21 PROCEDURE — 99211 OFF/OP EST MAY X REQ PHY/QHP: CPT

## 2022-11-21 PROCEDURE — 1123F ACP DISCUSS/DSCN MKR DOCD: CPT | Performed by: INTERNAL MEDICINE

## 2022-11-21 PROCEDURE — 99214 OFFICE O/P EST MOD 30 MIN: CPT | Performed by: INTERNAL MEDICINE

## 2022-11-21 ASSESSMENT — PATIENT HEALTH QUESTIONNAIRE - PHQ9
SUM OF ALL RESPONSES TO PHQ QUESTIONS 1-9: 0
SUM OF ALL RESPONSES TO PHQ9 QUESTIONS 1 & 2: 0
SUM OF ALL RESPONSES TO PHQ QUESTIONS 1-9: 0
1. LITTLE INTEREST OR PLEASURE IN DOING THINGS: 0
2. FEELING DOWN, DEPRESSED OR HOPELESS: 0
SUM OF ALL RESPONSES TO PHQ QUESTIONS 1-9: 0
SUM OF ALL RESPONSES TO PHQ QUESTIONS 1-9: 0

## 2022-11-21 NOTE — PROGRESS NOTES
This nurse called [facility] @ [phone] and spoke to staff to have them reverse the claim for the Zytiga so it could be processed by [facility]. They informed this nruse that they would reverse the claim. This nurse called and left a VM for 0[address] @ [phone] x [phone] to inform her. This nurse has talked to Zeke[facility]gozi Delmi at [address] [place] [address] twice since speaking with [facility] this morning and [facility] is still unable to process the prescription due to \"refill too soon. \" This nurse has attempted to call [facility] back 5 times without an answer or option to leave a message. Will continue to attempt to contact them.

## 2022-11-21 NOTE — PROGRESS NOTES
MA Rooming Questions  Patient: Oneal Valdes  MRN: [id number]    Date: 11/21/2022        1. Do you have any new issues?   no         2. Do you need any refills on medications?    no    3. Have you had any imaging done since your last visit? yes - labs 10/28    4. Have you been hospitalized or seen in the emergency room since your last visit here?   no    5. Did the patient have a depression screening completed today?  Yes    PHQ-9 Total Score: 0 (11/21/2022  1:13 PM)       PHQ-9 Given to (if applicable):               PHQ-9 Score (if applicable):                     [] Positive     []  Negative              Does question #9 need addressed (if applicable)                     [] Yes    []  No               Abena Beltran CMA

## 2022-11-21 NOTE — LETTER
[address] [place] [address] [address] [place] [address]  Phone: [phone]  Fax: [phone]           Manfred Oseguera MD      November 21, 2022     Patient: Sina Ashraf   MR Number: [id number]   YOB: 1950   Date of Visit: 11/21/2022       Dear Dr. Tad Palacio ref. provider found: Thank you for referring Ky Hipps to me for evaluation/treatment. Below are the relevant portions of my assessment and plan of care. If you have questions, please do not hesitate to call me. I look forward to following Kari Espinosa along with you.     Sincerely,        Manfred Oseguera MD    CC providers:  Lo Messina MD  [address] [place] [id number]  Via Fax: [phone]     Yared Lima MD  [address] [facility]  Via In [facility]

## 2022-11-29 ENCOUNTER — TELEPHONE (OUTPATIENT)
Dept: ONCOLOGY | Age: 72
End: 2022-11-29

## 2022-11-29 NOTE — TELEPHONE ENCOUNTER
Called patient regarding chemo ed and start date of tx, patient is scheduled for chemo ed on Fri. 12/02 @ 0900 and tx on Mon 12/05 @ 0915, advised them that 1 visitor allowed @ time of education and day of their treatments, patient states understanding

## 2022-12-01 RX ORDER — ONDANSETRON HYDROCHLORIDE 8 MG/1
8 TABLET, FILM COATED ORAL EVERY 8 HOURS PRN
Qty: 90 TABLET | Refills: 3 | Status: SHIPPED | OUTPATIENT
Start: 2022-12-01

## 2022-12-01 RX ORDER — DEXAMETHASONE 4 MG/1
TABLET ORAL
Qty: 12 TABLET | Refills: 5 | Status: SHIPPED | OUTPATIENT
Start: 2022-12-01

## 2022-12-01 NOTE — PROGRESS NOTES
Patient Name: Soumya Callahan    Patient : 1950     Patient MRN: [id number]       Date: 2022                                                                                                   CHEMOTHERAPY TREATMENT PLAN    Care Team  Medical Oncologist: Hilda Keenan MD  Surgeon:   Radiation Oncologist:   Primary Care Physician: Hernandez Dawn MD     Learning Needs Assessment  Before the treatment plan was discussed and the consent was signed, the care team assessed the patient's learning needs. This includes their ability to assume responsibility for managing their therapy. Diagnosis      Prostate cancer, metastatic to bone    The Goal of My Therapy is    (  ) To cure my cancer  (  ) To shrink the tumor prior to surgery  (  ) Increase my chance of cure after surgery  (x) Help me live as long as possible with the highest quality of life. I know that a cure is not possible. Prognosis    (  ) Curable  ( x ) Palliative    Plan Of Treatment    Intent:  (  ) Neoadjuvant   (  ) Adjuvant   ( x ) Control    Treatment Regimen  (including supportive meds)                             Frequency                                               Duration     Docetaxel every 21 days  Prednisone       Expected Response to Treatment    (  ) This therapy could cure your disease  ( x ) This therapy has a good chance of helping you live 1 year or more compared to without it  (  ) This therapy has a good chance of helping you feel better or making you live months longer compared to without it     Quality Of Life    (  ) Expect that you will be able to continue all normal activities  (  ) Expect that you will have some side effects but should be able to maintain normal activities  ( x ) Expect that you will be unable to work but able to perform light duties at home  (  ) Expect that you will be able to perform light duties and will need assistance with self care    Treatment Benefits and Harms     1.     Patient taught on docetaxel. We discussed potential AE including, but not limited to: myelosuppression, fluid retention,  Nephrotoxicity, central neurotoxicity, ototoxicity, abnormal electrolyte,peripheral neuropathy, nausea, diarrhea mucositis, fatigue, nail changes, vomiting, myalgia, arthralgias, increased LFT's, allergic reactions, hair loss, etc. He is asked to call our office for temperature 100.4 or greater or with any questions or concerns. Advance Directive:  paperwork provided  Counseling- referral placed  [facility]- referral placed    Declines genetic counseling. /83 (Site: Right Upper Arm, Position: Sitting, Cuff Size: Medium Adult)   Pulse 80   Temp 97.3 °F (36.3 °C) (Infrared)   Resp 18   Ht 6' (1.829 m)   Wt 193 lb (87.5 kg)   SpO2 95%   BMI 26.18 kg/m²     Physical Exam  HENT:      Head: Normocephalic. Mouth/Throat:      Mouth: Mucous membranes are moist.   Eyes:      Extraocular Movements: Extraocular movements intact. Conjunctiva/sclera: Conjunctivae normal.   Cardiovascular:      Rate and Rhythm: Normal rate and regular rhythm. Pulmonary:      Effort: Pulmonary effort is normal.      Breath sounds: Normal breath sounds. Musculoskeletal:         General: Normal range of motion. Skin:     General: Skin is warm. Neurological:      General: No focal deficit present. Mental Status: He is alert and oriented to person, place, and time. Psychiatric:         Mood and Affect: Mood normal.         Behavior: Behavior normal.         Thought Content: Thought content normal.         Judgment: Judgment normal.        .  Today, time spent with the patient discussing the intent and schedule of their treatment. The patient was given a copy of their treatment summary. Questions and concerns were addressed with the patient. Time spent with the patient face to face today was 60 minutes, counseling and coordination of care dominated more than 50% of this patient encounter.

## 2022-12-02 ENCOUNTER — OFFICE VISIT (OUTPATIENT)
Dept: ONCOLOGY | Age: 72
End: 2022-12-02
Payer: COMMERCIAL

## 2022-12-02 ENCOUNTER — HOSPITAL ENCOUNTER (OUTPATIENT)
Dept: INFUSION THERAPY | Age: 72
Discharge: HOME | End: 2022-12-02
Payer: COMMERCIAL

## 2022-12-02 VITALS
RESPIRATION RATE: 18 BRPM | WEIGHT: 193 LBS | HEART RATE: 80 BPM | HEIGHT: 72 IN | OXYGEN SATURATION: 95 % | SYSTOLIC BLOOD PRESSURE: 133 MMHG | TEMPERATURE: 97.3 F | BODY MASS INDEX: 26.14 KG/M2 | DIASTOLIC BLOOD PRESSURE: 83 MMHG

## 2022-12-02 DIAGNOSIS — C79.51 PROSTATE CANCER METASTATIC TO BONE (HCC): ICD-10-CM

## 2022-12-02 DIAGNOSIS — Z71.89 ADVANCE DIRECTIVE DISCUSSED WITH PATIENT: ICD-10-CM

## 2022-12-02 DIAGNOSIS — C79.51 PROSTATE CANCER METASTATIC TO BONE (HCC): Primary | ICD-10-CM

## 2022-12-02 DIAGNOSIS — Z71.9 ENCOUNTER FOR EDUCATION: ICD-10-CM

## 2022-12-02 DIAGNOSIS — C61 PROSTATE CANCER METASTATIC TO BONE (HCC): Primary | ICD-10-CM

## 2022-12-02 DIAGNOSIS — C61 PROSTATE CANCER METASTATIC TO BONE (HCC): ICD-10-CM

## 2022-12-02 LAB
ALBUMIN SERPL-MCNC: 4.1 GM/DL (ref 3.4–5)
ALP BLD-CCNC: 164 IU/L (ref 40–128)
ALT SERPL-CCNC: 10 U/L (ref 10–40)
ANION GAP SERPL CALCULATED.3IONS-SCNC: 11 MMOL/L (ref 4–16)
AST SERPL-CCNC: 14 IU/L (ref 15–37)
BASOPHILS ABSOLUTE: 0.1 K/CU MM
BASOPHILS RELATIVE PERCENT: 0.6 % (ref 0–1)
BILIRUB SERPL-MCNC: 0.5 MG/DL (ref 0–1)
BUN BLDV-MCNC: 20 MG/DL (ref 6–23)
CALCIUM SERPL-MCNC: 9.3 MG/DL (ref 8.3–10.6)
CHLORIDE BLD-SCNC: 104 MMOL/L (ref 99–110)
CO2: 26 MMOL/L (ref 21–32)
CREAT SERPL-MCNC: 1.1 MG/DL (ref 0.9–1.3)
DIFFERENTIAL TYPE: ABNORMAL
EOSINOPHILS ABSOLUTE: 0.2 K/CU MM
EOSINOPHILS RELATIVE PERCENT: 3.1 % (ref 0–3)
GFR SERPL CREATININE-BSD FRML MDRD: >60 ML/MIN/1.73M2
GLUCOSE BLD-MCNC: 97 MG/DL (ref 70–99)
HCT VFR BLD CALC: 45.2 % (ref 42–52)
HEMOGLOBIN: 14.7 GM/DL (ref 13.5–18)
LYMPHOCYTES ABSOLUTE: 1.7 K/CU MM
LYMPHOCYTES RELATIVE PERCENT: 21.5 % (ref 24–44)
MCH RBC QN AUTO: 27.4 PG (ref 27–31)
MCHC RBC AUTO-ENTMCNC: 32.5 % (ref 32–36)
MCV RBC AUTO: 84.2 FL (ref 78–100)
MONOCYTES ABSOLUTE: 0.7 K/CU MM
MONOCYTES RELATIVE PERCENT: 8.5 % (ref 0–4)
PDW BLD-RTO: 15 % (ref 11.7–14.9)
PLATELET # BLD: 231 K/CU MM (ref 140–440)
PMV BLD AUTO: 10 FL (ref 7.5–11.1)
POTASSIUM SERPL-SCNC: 4.6 MMOL/L (ref 3.5–5.1)
RBC # BLD: 5.37 M/CU MM (ref 4.6–6.2)
SEGMENTED NEUTROPHILS ABSOLUTE COUNT: 5.2 K/CU MM
SEGMENTED NEUTROPHILS RELATIVE PERCENT: 66.3 % (ref 36–66)
SODIUM BLD-SCNC: 141 MMOL/L (ref 135–145)
TOTAL PROTEIN: 6.7 GM/DL (ref 6.4–8.2)
WBC # BLD: 7.8 K/CU MM (ref 4–10.5)

## 2022-12-02 PROCEDURE — 36415 COLL VENOUS BLD VENIPUNCTURE: CPT

## 2022-12-02 PROCEDURE — 99211 OFF/OP EST MAY X REQ PHY/QHP: CPT | Performed by: NURSE PRACTITIONER

## 2022-12-02 PROCEDURE — 80053 COMPREHEN METABOLIC PANEL: CPT

## 2022-12-02 PROCEDURE — 85025 COMPLETE CBC W/AUTO DIFF WBC: CPT

## 2022-12-02 PROCEDURE — 99213 OFFICE O/P EST LOW 20 MIN: CPT

## 2022-12-02 RX ORDER — PREDNISONE 1 MG/1
TABLET ORAL
Qty: 60 TABLET | Refills: 5 | Status: SHIPPED | OUTPATIENT
Start: 2022-12-02

## 2022-12-02 NOTE — PROGRESS NOTES
Patient arrived with brother for chemotherapy education. Discussed treatment plan, potential side effects, prevention and symptom management. Reviewed in detail chemotherapy education folder and drug monograph. Patient's regimen will be Taxotere Q21D and Zytiga 1,000 mg PO daily. Chemotherapy consent signed by patient and will be scanned into patient's chart. Copy given to patient. Instructed patient to take prednisone 5 mg PO BID except on days taking dexamethasone. Patient to take dexamethasone 8 mg the day BEFORE and D2,3 of each chemotherapy cycle. RX for prednisone, dexamethasone, and zofran Q8 hours Q8 hours PRN e-scribed to [facility] pharmacy in [place] per CNP order. Patient voices understanding on how and when to take these medications. C1-C2 calendar provided with tx regimen, appointment times, and written instructions. Contact information to [facility] and this RN as well as on-call phone number for after office hours/weekends provided to patient. CBC and CMP will be drawn today 12/02/2022. Confirmed first appointment at [address] [place] [address] on 09/05/2022. Encouraged patient to ask questions and allowed patient to express feelings during visit. All needs addressed. Patient denies any further questions or concerns at this time.

## 2022-12-02 NOTE — PROGRESS NOTES
MA Rooming Questions  Patient: Estevan Nicole  MRN: [id number]    Date: 12/2/2022        Tx Herber Medina

## 2022-12-05 ENCOUNTER — HOSPITAL ENCOUNTER (OUTPATIENT)
Dept: INFUSION THERAPY | Age: 72
Discharge: HOME | End: 2022-12-05
Payer: COMMERCIAL

## 2022-12-05 VITALS
HEART RATE: 81 BPM | TEMPERATURE: 97.7 F | DIASTOLIC BLOOD PRESSURE: 82 MMHG | SYSTOLIC BLOOD PRESSURE: 135 MMHG | HEIGHT: 72 IN | OXYGEN SATURATION: 95 % | WEIGHT: 197.2 LBS | BODY MASS INDEX: 26.71 KG/M2

## 2022-12-05 DIAGNOSIS — C79.51 PROSTATE CANCER METASTATIC TO BONE (HCC): Primary | ICD-10-CM

## 2022-12-05 DIAGNOSIS — C61 PROSTATE CANCER METASTATIC TO BONE (HCC): Primary | ICD-10-CM

## 2022-12-05 PROCEDURE — 2580000003 HC RX 258: Performed by: INTERNAL MEDICINE

## 2022-12-05 PROCEDURE — 6360000002 HC RX W HCPCS: Performed by: INTERNAL MEDICINE

## 2022-12-05 PROCEDURE — 96415 CHEMO IV INFUSION ADDL HR: CPT

## 2022-12-05 PROCEDURE — 96413 CHEMO IV INFUSION 1 HR: CPT

## 2022-12-05 PROCEDURE — 96375 TX/PRO/DX INJ NEW DRUG ADDON: CPT

## 2022-12-05 PROCEDURE — 2500000003 HC RX 250 WO HCPCS: Performed by: INTERNAL MEDICINE

## 2022-12-05 RX ORDER — SODIUM CHLORIDE 9 MG/ML
5-250 INJECTION, SOLUTION INTRAVENOUS PRN
Status: DISCONTINUED | OUTPATIENT
Start: 2022-12-05 | End: 2022-12-06 | Stop reason: HOSPADM

## 2022-12-05 RX ORDER — DEXAMETHASONE SODIUM PHOSPHATE 4 MG/ML
8 INJECTION, SOLUTION INTRA-ARTICULAR; INTRALESIONAL; INTRAMUSCULAR; INTRAVENOUS; SOFT TISSUE ONCE
Status: COMPLETED | OUTPATIENT
Start: 2022-12-05 | End: 2022-12-05

## 2022-12-05 RX ORDER — FAMOTIDINE 10 MG/ML
20 INJECTION, SOLUTION INTRAVENOUS
Status: DISCONTINUED | OUTPATIENT
Start: 2022-12-05 | End: 2022-12-06 | Stop reason: HOSPADM

## 2022-12-05 RX ORDER — DIPHENHYDRAMINE HYDROCHLORIDE 50 MG/ML
50 INJECTION INTRAMUSCULAR; INTRAVENOUS
Status: DISCONTINUED | OUTPATIENT
Start: 2022-12-05 | End: 2022-12-06 | Stop reason: HOSPADM

## 2022-12-05 RX ADMIN — SODIUM CHLORIDE 149 MG: 9 INJECTION, SOLUTION INTRAVENOUS at 10:26

## 2022-12-05 RX ADMIN — SODIUM CHLORIDE 20 ML/HR: 9 INJECTION, SOLUTION INTRAVENOUS at 09:40

## 2022-12-05 RX ADMIN — DEXAMETHASONE SODIUM PHOSPHATE 8 MG: 4 INJECTION, SOLUTION INTRAMUSCULAR; INTRAVENOUS at 09:38

## 2022-12-05 NOTE — PROGRESS NOTES
Ambulated to infusion area, accompanied by brother. Here to initiate chemo. Patient had treatment planning, labs and consent last week. Lab results verified. All questions answered. Discussed side effect management and prescriptions related to treatment. States he did not take Prednisone this am.     Docetaxel administered as ordered. Call light within reach. Brother at chairside. Instructed patient to notify nursing staff with any issues or concerns. At 1030, Patient developed a cough and facial flushing. Also reports mild chest discomfort. Chemo infusion stopped. NS wide open. VS /74, HR 92, Resp 18, O2 Sats 92% RA. Jeni GARCIA at chairside. At 1032, Benadryl 50 mg and Pepcid 20 mg administered IV push. At 1040, VS /73, HR 83, Resp 18, O2 Sats 91% RA. Patient reports some SOB. Oxygen 2L applied for comfort. At 1100, VS /75, HR 94, O2 Sats 98% RA. Patient reports experiencing some dizziness from the Benadryl. Instructed to ambulate with assistance. At [phone], Patient back to baseline, all issues resolved. Oxygen removed. Docetaxol restarted at 50ml/hr for 30 min then titrated up to max rate of 257 ml/hr. Completed remaining infusion without experiencing any issues. Instructed patient to notify office with any questions or unmanageable issues. AVS provided. Discharged in stable condition, ambulatory. RTC on 12/16 for Tox check and interim labs.

## 2022-12-05 NOTE — PROGRESS NOTES
Pt with chest pain/rxn with docetaxel. Added pepcid 20mg IV, benadryl 50mg IV and slowed admin time to 90 min for future treatments per RADHA Miller.

## 2022-12-07 ENCOUNTER — CLINICAL DOCUMENTATION (OUTPATIENT)
Dept: RADIATION ONCOLOGY | Age: 72
End: 2022-12-07

## 2022-12-07 NOTE — CARE COORDINATION
Pt referred to [facility] counselor Denis Kearney for supportive counseling services.

## 2022-12-16 ENCOUNTER — OFFICE VISIT (OUTPATIENT)
Dept: ONCOLOGY | Age: 72
End: 2022-12-16
Payer: COMMERCIAL

## 2022-12-16 ENCOUNTER — HOSPITAL ENCOUNTER (OUTPATIENT)
Dept: INFUSION THERAPY | Age: 72
Discharge: HOME | End: 2022-12-16
Payer: COMMERCIAL

## 2022-12-16 VITALS
DIASTOLIC BLOOD PRESSURE: 72 MMHG | HEIGHT: 72 IN | WEIGHT: 187.6 LBS | SYSTOLIC BLOOD PRESSURE: 135 MMHG | HEART RATE: 99 BPM | TEMPERATURE: 97.5 F | BODY MASS INDEX: 25.41 KG/M2 | OXYGEN SATURATION: 95 %

## 2022-12-16 DIAGNOSIS — C79.51 PROSTATE CANCER METASTATIC TO BONE (HCC): Primary | ICD-10-CM

## 2022-12-16 DIAGNOSIS — C61 PROSTATE CANCER METASTATIC TO BONE (HCC): ICD-10-CM

## 2022-12-16 DIAGNOSIS — C61 PROSTATE CANCER METASTATIC TO BONE (HCC): Primary | ICD-10-CM

## 2022-12-16 DIAGNOSIS — C79.51 PROSTATE CANCER METASTATIC TO BONE (HCC): ICD-10-CM

## 2022-12-16 LAB
ALBUMIN SERPL-MCNC: 3.7 GM/DL (ref 3.4–5)
ALP BLD-CCNC: 154 IU/L (ref 40–128)
ALT SERPL-CCNC: 14 U/L (ref 10–40)
ANION GAP SERPL CALCULATED.3IONS-SCNC: 13 MMOL/L (ref 4–16)
AST SERPL-CCNC: 14 IU/L (ref 15–37)
BASOPHILS ABSOLUTE: 0 K/CU MM
BASOPHILS RELATIVE PERCENT: 1.6 % (ref 0–1)
BILIRUB SERPL-MCNC: 0.3 MG/DL (ref 0–1)
BUN BLDV-MCNC: 20 MG/DL (ref 6–23)
CALCIUM SERPL-MCNC: 9.6 MG/DL (ref 8.3–10.6)
CHLORIDE BLD-SCNC: 106 MMOL/L (ref 99–110)
CO2: 26 MMOL/L (ref 21–32)
CREAT SERPL-MCNC: 1.3 MG/DL (ref 0.9–1.3)
DIFFERENTIAL TYPE: ABNORMAL
EOSINOPHILS ABSOLUTE: 0 K/CU MM
EOSINOPHILS RELATIVE PERCENT: 0.8 % (ref 0–3)
GFR SERPL CREATININE-BSD FRML MDRD: 59 ML/MIN/1.73M2
GLUCOSE BLD-MCNC: 83 MG/DL (ref 70–99)
HCT VFR BLD CALC: 42 % (ref 42–52)
HEMOGLOBIN: 13.6 GM/DL (ref 13.5–18)
LYMPHOCYTES ABSOLUTE: 1.3 K/CU MM
LYMPHOCYTES RELATIVE PERCENT: 51.2 % (ref 24–44)
MCH RBC QN AUTO: 27.3 PG (ref 27–31)
MCHC RBC AUTO-ENTMCNC: 32.4 % (ref 32–36)
MCV RBC AUTO: 84.3 FL (ref 78–100)
MONOCYTES ABSOLUTE: 1.1 K/CU MM
MONOCYTES RELATIVE PERCENT: 44.4 % (ref 0–4)
PDW BLD-RTO: 14.9 % (ref 11.7–14.9)
PLATELET # BLD: 227 K/CU MM (ref 140–440)
PMV BLD AUTO: 10.5 FL (ref 7.5–11.1)
POTASSIUM SERPL-SCNC: 3.6 MMOL/L (ref 3.5–5.1)
RBC # BLD: 4.98 M/CU MM (ref 4.6–6.2)
SEGMENTED NEUTROPHILS ABSOLUTE COUNT: 0.1 K/CU MM
SEGMENTED NEUTROPHILS RELATIVE PERCENT: 2 % (ref 36–66)
SODIUM BLD-SCNC: 145 MMOL/L (ref 135–145)
TOTAL PROTEIN: 5.8 GM/DL (ref 6.4–8.2)
WBC # BLD: 2.5 K/CU MM (ref 4–10.5)

## 2022-12-16 PROCEDURE — 99211 OFF/OP EST MAY X REQ PHY/QHP: CPT

## 2022-12-16 PROCEDURE — 85025 COMPLETE CBC W/AUTO DIFF WBC: CPT

## 2022-12-16 PROCEDURE — 1123F ACP DISCUSS/DSCN MKR DOCD: CPT | Performed by: PHYSICIAN ASSISTANT

## 2022-12-16 PROCEDURE — 36415 COLL VENOUS BLD VENIPUNCTURE: CPT

## 2022-12-16 PROCEDURE — 80053 COMPREHEN METABOLIC PANEL: CPT

## 2022-12-16 PROCEDURE — 99213 OFFICE O/P EST LOW 20 MIN: CPT | Performed by: PHYSICIAN ASSISTANT

## 2022-12-16 NOTE — PROGRESS NOTES
Patient Name:  Marina Carver  Patient :  1950  Patient MRN:  [id number]     Primary Oncologist: Kristian Glez MD  Referring Provider: Abby Bautista MD     Date of Service: 2022     Chief Complaint:    Chief Complaint   Patient presents with    Follow-up       Patient's active problem list:        Metastatic prostate cancer    HPI:   Marina Carver is a 68-year-old very pleasant gentleman with medical history significant for hypertension, hyperlipidemia, COPD, GERD, osteoarthritis and history of kidney stone, referred to me on 2022 for evaluation of metastatic prostate cancer. He initially presented to [facility] on 22 with abdominal pain and PVR was about 9 liter. CT scan on 22 showed cystitis, b/l moderate to severe HN, b/l renal stones. All the renal stones are non obstructive and his ARF resolved with koch catheter. He was also noted to have significant elevation in PSA (331 ng/ml on 22). It was 150 on 2022 and 5 on 2019. RACHEL at that time showed rock hard large prostate. MRI pelvis showed diffuse tumor involvement of the prostate gland, with loss of zonal anatomy, extraprostatic extension and involvement of the seminal vesicles and suspected involvement of the neurovascular bundles, PI-RADS 5. Enlarged pelvic and retroperitoneal lymph nodes consistent with willow metastasis. A larger right common iliac chain lymph node results in mass-effect on the right common iliac vein. Early mucosa enhancement of the urinary bladder. Differential includes a cystitis although involvement of the urinary bladder is not excluded given extensive involvement of the prostate gland. He has right lower extremity swelling. Doppler study was negative for DVT and Echo showed normal EF. RLE swelling is most likely due to mass effect from enlarged lymph node to right common iliac vein.      Bone scan done on 2022 showed findings concerning for metastatic disease within the T10 vertebral body. Findings consistent with degenerative changes in the spine and multiple joints and possibly an old fracture in the left rib cage. He is scheduled for TURP to get tissue diagnosis and to release urine outflow obstruction on 10/13/22. Dr. Gordon Barney started him on proscar and casodex on 8/19/22. He underwent transurethral resection of prostate cancer on 10/13/22 and pathology showed prostate adenocarcinoma, fabi score 4+4=8, (grade group 4, 12/15 cores, involving approximately 30% of tissue present. Dr. Gordon Barney started ADT with eligard since early November 2022. On November 21, 2022, he presented to me for follow-up. I have been following him for prostate cancer. Clinically, he has castration sensitive prostate cancer, grade group 4 and I reviewed with him all the treatment options. I recommend ADT with docetaxel and abiraterone. Reviewed with him all the potential side effects as well as benefits of the therapy. After long discussion, he is in agreement with the plans. I discussed his case with Dr. Gordon Barney and Duy Barbosa was started early November 2022. C1D1 Docetaxel 12/5/2022. No dose limiting side effects to date. Pt started abiraterone around same time. Will check interim labs this visit. Had infusion reaction with C1 and received benedryl and pepcid. Resumed infusion at slower rate with no recurrence. Will continue slower rate for future infusion. Neuropathy since 2015, unchanged. Never formally diagnosed. More frequent headaches, taking tylenol    LE Edema is stable. I will continue to follow him closely during chemotherapy. He stated that he is feeling some improvement after TURP and ADT. He doesn't have any new complaints on today visit. Past Medical History:     Significant for  1. Hypertension  2. Hyperlipidemia  3. COPD  4. GERD  5. Osteoarthritis  6. History of kidney stone    Past Surgery History:    Significant for  1. Laparoscopic cholecystectomy  2. Right leg surgery  3. Tonsillectomy                                                                          4.  Endoscopy with dilatation    Social History:   He is a former smoker and he quit smoking in 7/10/2015. He used to smoke 2 packs a day for approximately 45 years. He denies alcohol drinking or illicit drug abuse. Family History:    Significant for COPD and CHF in his mother. No Known Allergies    Review of Systems: \"Per interval history; otherwise 10 point ROS is negative. \"  His energy level is fair and his sleep is fine. He doesn't have fever, chills, night sweats, cough, shortness of breath, chest pain, hemoptysis or palpitations. His bowel and bladder functions are normal. He doesn't have nausea, vomiting, abdominal pain, diarrhea, constipation, dysuria, loss of appetite or weight loss. He doesn't have neuropathy and he denies bleeding or clotting issues. He denies any pain in his body. No anxiety or depression. The rest of the systems are unremarkable.      Vital Signs: /72 (Site: Right Upper Arm, Position: Sitting, Cuff Size: Medium Adult)   Pulse 99   Temp 97.5 °F (36.4 °C) (Infrared)   Ht 6' (1.829 m)   Wt 187 lb 9.6 oz (85.1 kg)   SpO2 95%   BMI 25.44 kg/m²      Physical Exam:  CONSTITUTIONAL: awake, alert, cooperative, no apparent distress   EYES: EOMI, sclera clear, normal conjunctiva  ENT: Normocephalic, without obvious abnormality, atraumatic  NECK: supple, symmetrical, no jugular venous distension, no carotid bruits   HEMATOLOGIC/LYMPHATIC: no cervical, supraclavicular or axillary lymphadenopathy   LUNGS: VBS, no wheezes, no increased work of breathing, no rhonchi, clear to auscultation, no crackles,    CARDIOVASCULAR: regular rate and rhythm, normal S1 and S2, no murmur noted  ABDOMEN: normal bowel sounds x 4, soft, non-distended, non-tender, no masses palpated, no hepatosplenomegaly   MUSCULOSKELETAL: full range of motion noted, tone is normal  NEUROLOGIC: awake, alert, oriented to name, place and time. Motor skills grossly intact. SKIN: appears intact, normal texture, normal turgor, no jaundice.    EXTREMITIES: no clubbing, RLE swelling +, no cyanosis,        Labs:  Hematology:  Lab Results   Component Value Date    WBC 7.8 12/02/2022    RBC 5.37 12/02/2022    HGB 14.7 12/02/2022    HCT 45.2 12/02/2022    MCV 84.2 12/02/2022    MCH 27.4 12/02/2022    MCHC 32.5 12/02/2022    RDW 15.0 (H) 12/02/2022     12/02/2022    MPV 10.0 12/02/2022    SEGSPCT 66.3 (H) 12/02/2022    EOSRELPCT 3.1 (H) 12/02/2022    BASOPCT 0.6 12/02/2022    LYMPHOPCT 21.5 (L) 12/02/2022    MONOPCT 8.5 (H) 12/02/2022    SEGSABS 5.2 12/02/2022    EOSABS 0.2 12/02/2022    BASOSABS 0.1 12/02/2022    LYMPHSABS 1.7 12/02/2022    MONOSABS 0.7 12/02/2022    DIFFTYPE AUTOMATED DIFFERENTIAL 12/02/2022     No results found for: ESR  Chemistry:  Lab Results   Component Value Date     12/02/2022    K 4.6 12/02/2022     12/02/2022    CO2 26 12/02/2022    BUN 20 12/02/2022    CREATININE 1.1 12/02/2022    GLUCOSE 97 12/02/2022    CALCIUM 9.3 12/02/2022    PROT 6.7 12/02/2022    LABALBU 4.1 12/02/2022    BILITOT 0.5 12/02/2022    ALKPHOS 164 (H) 12/02/2022    AST 14 (L) 12/02/2022    ALT 10 12/02/2022    LABGLOM >60 12/02/2022     No results found for: MMA, LDH, HOMOCYSTEINE  No components found for: LD  No results found for: TSHHS, T4FREE, FT3  Immunology:  Lab Results   Component Value Date    PROT 6.7 12/02/2022     No results found for: Shira Nicaraguan, KLFLCR  No results found for: B2M  Coagulation Panel:  No results found for: PROTIME, INR, APTT, DDIMER  Anemia Panel:  No results found for: [id number], FOLATE  Tumor Markers:  Lab Results   Component Value Date    PSA 63.7 (H) 10/27/2022        Observations:  No data recorded     Assessment   Castration naive metastatic prostate cancer    Plan:  Jasper Reid is a 68-year-old very pleasant gentleman who initially Jeni Garcia PA-C    Portions of this note are copied forward from previous clinic note. Interval history, ROS, physical exam, assessment and plan has been reviewed and updated for accuracy by this provider.

## 2022-12-16 NOTE — PROGRESS NOTES
MA Rooming Questions  Patient: Edwina Fernando  MRN: [id number]    Date: 12/16/2022        1. Do you have any new issues?   no         2. Do you need any refills on medications?    no    3. Have you had any imaging done since your last visit?   no    4. Have you been hospitalized or seen in the emergency room since your last visit here?   no    5. Did the patient have a depression screening completed today?  No    No data recorded     PHQ-9 Given to (if applicable):               PHQ-9 Score (if applicable):                     [] Positive     []  Negative              Does question #9 need addressed (if applicable)                     [] Yes    []  No               Nimisha Burgos, SOCRATES

## 2022-12-21 DIAGNOSIS — C79.51 PROSTATE CANCER METASTATIC TO BONE (HCC): Primary | ICD-10-CM

## 2022-12-21 DIAGNOSIS — C61 PROSTATE CANCER METASTATIC TO BONE (HCC): Primary | ICD-10-CM

## 2022-12-22 ENCOUNTER — CLINICAL DOCUMENTATION (OUTPATIENT)
Dept: RADIATION ONCOLOGY | Age: 72
End: 2022-12-22

## 2022-12-25 NOTE — PROGRESS NOTES
Patient Name:  Ana Rosa Nielsen  Patient :  1950  Patient MRN:  [id number]     Primary Oncologist: Rhoda Nuñez MD  Referring Provider: Brett Goins MD     Date of Service: 2022     Chief Complaint:    Chief Complaint   Patient presents with    Follow-up    Chemotherapy       Patient's active problem list:        Metastatic prostate cancer    HPI:   Ana Rosa Nielsen is a 80-year-old very pleasant gentleman with medical history significant for hypertension, hyperlipidemia, COPD, GERD, osteoarthritis and history of kidney stone, referred to me on 2022 for evaluation of metastatic prostate cancer. He initially presented to [facility] on 22 with abdominal pain and PVR was about 9 liter. CT scan on 22 showed cystitis, b/l moderate to severe HN, b/l renal stones. All the renal stones are non obstructive and his ARF resolved with koch catheter. He was also noted to have significant elevation in PSA (331 ng/ml on 22). It was 150 on 2022 and 5 on 2019. RACHEL at that time showed rock hard large prostate. MRI pelvis showed diffuse tumor involvement of the prostate gland, with loss of zonal anatomy, extraprostatic extension and involvement of the seminal vesicles and suspected involvement of the neurovascular bundles, PI-RADS 5. Enlarged pelvic and retroperitoneal lymph nodes consistent with willow metastasis. A larger right common iliac chain lymph node results in mass-effect on the right common iliac vein. Early mucosa enhancement of the urinary bladder. Differential includes a cystitis although involvement of the urinary bladder is not excluded given extensive involvement of the prostate gland. He has right lower extremity swelling. Doppler study was negative for DVT and Echo showed normal EF. RLE swelling is most likely due to mass effect from enlarged lymph node to right common iliac vein.      Bone scan done on 2022 showed findings concerning for metastatic disease within the T10 vertebral body. Findings consistent with degenerative changes in the spine and multiple joints and possibly an old fracture in the left rib cage. He is scheduled for TURP to get tissue diagnosis and to release urine outflow obstruction on 10/13/22. Dr. Lexus Santamaria started him on proscar and casodex on 8/19/22. He underwent transurethral resection of prostate cancer on 10/13/22 and pathology showed prostate adenocarcinoma, fabi score 4+4=8, (grade group 4, 12/15 cores, involving approximately 30% of tissue present. Dr. Lexus Santamaria started ADT with eligard since early November 2022. Chemotherapy with docetaxel and abiraterone were started on 12/5/22. On December 27, 2022, he presented to me for follow-up. I have been following him for prostate cancer. Clinically, he has castration sensitive prostate cancer, grade group 4 and I reviewed with him all the treatment options. I recommend ADT with docetaxel and abiraterone. Reviewed with him all the potential side effects as well as benefits of the therapy. After long discussion, he is in agreement with the plans. I discussed his case with Dr. Lexus Santamaria and Bates Jakize was started early November 2022. Docetaxel and abiraterone were started on 12/5/2022. He is s/p first cycle of docetaxel and he is tolerating it well. He has mild reaction on docetaxel on first cycle but able to finish it. Will give his second cycle today. I will continue to follow him closely during chemotherapy. He stated that he is feeling some improvement after TURP and ADT. He doesn't have any new complaints on today visit. Past Medical History:     Significant for  1. Hypertension  2. Hyperlipidemia  3. COPD  4. GERD  5. Osteoarthritis  6. History of kidney stone    Past Surgery History:    Significant for  1. Laparoscopic cholecystectomy  2. Right leg surgery  3.   Tonsillectomy 4. Endoscopy with dilatation    Social History:   He is a former smoker and he quit smoking in 7/10/2015. He used to smoke 2 packs a day for approximately 45 years. He denies alcohol drinking or illicit drug abuse. Family History:    Significant for COPD and CHF in his mother. No Known Allergies    Review of Systems: \"Per interval history; otherwise 10 point ROS is negative. \"  His energy level is pretty good and his sleep is fine. He doesn't have fever, chills, night sweats, cough, shortness of breath, chest pain, hemoptysis or palpitations. His bowel and bladder functions are normal. He doesn't have nausea, vomiting, abdominal pain, diarrhea, constipation, dysuria, loss of appetite or weight loss. He denies neuropathy and he doesn't have bleeding or clotting issues. He denies any pain in his body. No anxiety or depression. The rest of the systems are unremarkable. Vital Signs: BP (!) 144/73 (Site: Right Upper Arm, Position: Sitting, Cuff Size: Medium Adult)   Pulse 82   Temp 97.8 °F (36.6 °C) (Infrared)   Ht 6' (1.829 m)   Wt 194 lb 12.8 oz (88.4 kg)   SpO2 95%   BMI 26.42 kg/m²      Physical Exam:  CONSTITUTIONAL: awake, alert, cooperative, no apparent distress   EYES: pupils equal, round and reactive to light, sclera clear, normal conjunctiva  ENT: Normocephalic, without obvious abnormality, atraumatic  NECK: supple, symmetrical, no jugular venous distension, no carotid bruits   HEMATOLOGIC/LYMPHATIC: no cervical, supraclavicular or axillary lymphadenopathy   LUNGS: VBS, no wheezes, clear to auscultation, no crackles, no increased work of breathing, no rhonchi,    CARDIOVASCULAR: regular rate and rhythm, normal S1 and S2, no murmur noted  ABDOMEN: normal bowel sounds x 4, soft, non-distended, non-tender, no masses palpated, no hepatosplenomegaly   MUSCULOSKELETAL: full range of motion noted, tone is normal  NEUROLOGIC: awake, alert, oriented to name, place and time.  Motor skills grossly intact. SKIN: appears intact, normal skin color, normal texture, normal turgor, no jaundice. EXTREMITIES: no clubbing, RLE swelling +, no cyanosis,        Labs:  Hematology:  Lab Results   Component Value Date    WBC 13.4 (H) 12/27/2022    RBC 5.12 12/27/2022    HGB 13.9 12/27/2022    HCT 43.1 12/27/2022    MCV 84.2 12/27/2022    MCH 27.1 12/27/2022    MCHC 32.3 12/27/2022    RDW 15.7 (H) 12/27/2022     12/27/2022    MPV 10.8 12/27/2022    SEGSPCT 80.2 (H) 12/27/2022    EOSRELPCT 0.0 12/27/2022    BASOPCT 0.1 12/27/2022    LYMPHOPCT 11.0 (L) 12/27/2022    MONOPCT 8.7 (H) 12/27/2022    SEGSABS 10.7 12/27/2022    EOSABS 0.0 12/27/2022    BASOSABS 0.0 12/27/2022    LYMPHSABS 1.5 12/27/2022    MONOSABS 1.2 12/27/2022    DIFFTYPE AUTOMATED DIFFERENTIAL 12/27/2022     No results found for: ESR  Chemistry:  Lab Results   Component Value Date     12/27/2022    K 4.1 12/27/2022     12/27/2022    CO2 27 12/27/2022    BUN 20 12/27/2022    CREATININE 1.1 12/27/2022    GLUCOSE 97 12/27/2022    CALCIUM 9.5 12/27/2022    PROT 6.6 12/27/2022    LABALBU 4.1 12/27/2022    BILITOT 0.4 12/27/2022    ALKPHOS 138 (H) 12/27/2022    AST 15 12/27/2022    ALT 13 12/27/2022    LABGLOM >60 12/27/2022     No results found for: MMA, LDH, HOMOCYSTEINE  No components found for: LD  No results found for: TSHHS, T4FREE, FT3  Immunology:  Lab Results   Component Value Date    PROT 6.6 12/27/2022     No results found for: Terkiaraa Alonzo, KLFLCR  No results found for: B2M  Coagulation Panel:  No results found for: PROTIME, INR, APTT, DDIMER  Anemia Panel:  No results found for: [id number], FOLATE  Tumor Markers:  Lab Results   Component Value Date    PSA 63.7 (H) 10/27/2022        Observations:  No data recorded     Assessment   Castration naive metastatic prostate cancer    Plan:  Tino Moran is a 82-year-old very pleasant gentleman who initially presented to [facility] with urinary outlet obstruction.      Further work up showed that he has rock hard large prostate on RACHEL with significantly elevated PSA (331). MRI showed multiple abdominal and pelvic lymphadenopathy. One of the right pelvic lymph node has mass effect of right common iliac vein and causing him significant right leg swelling. Bone scan showed metastatic disease in T10 vertebra body. He is scheduled for TURP to get tissue diagnosis and to release urine outflow obstruction on 10/13/22. Dr. Felton Pike started him on proscar and casodex on 8/19/22. He underwent transurethral resection of prostate cancer on 10/13/22 and pathology showed prostate adenocarcinoma, fabi score 4+4=8, (grade group 4, 12/15 cores, involving approximately 30% of tissue present. Dr. Felton Pike started ADT with eligard since early November 2022. Chemotherapy with docetaxel and abiraterone were started on 12/5/22. On December 27, 2022, he presented to me for follow-up. I have been following him for prostate cancer. Clinically, he has castration sensitive prostate cancer, grade group 4 and I reviewed with him all the treatment options. I recommend ADT with docetaxel and abiraterone. Reviewed with him all the potential side effects as well as benefits of the therapy. After long discussion, he is in agreement with the plans. I discussed his case with Dr. Felton Pike and Georgiana Fragoso was started early November 2022. Docetaxel and abiraterone were started on 12/5/2022. He is s/p first cycle of docetaxel and he is tolerating it well. He has mild reaction on docetaxel on first cycle but able to finish it. Will give his second cycle today. I will continue to follow him closely during chemotherapy. He stated that he is feeling some improvement after TURP and ADT. I answered all his questions and concerns for today. Recent imaging and labs were reviewed and discussed with the patient.

## 2022-12-27 ENCOUNTER — HOSPITAL ENCOUNTER (OUTPATIENT)
Dept: INFUSION THERAPY | Age: 72
Discharge: HOME | End: 2022-12-27
Payer: COMMERCIAL

## 2022-12-27 ENCOUNTER — OFFICE VISIT (OUTPATIENT)
Dept: ONCOLOGY | Age: 72
End: 2022-12-27
Payer: COMMERCIAL

## 2022-12-27 VITALS
DIASTOLIC BLOOD PRESSURE: 73 MMHG | WEIGHT: 194.8 LBS | HEIGHT: 72 IN | SYSTOLIC BLOOD PRESSURE: 144 MMHG | TEMPERATURE: 97.8 F | HEART RATE: 82 BPM | OXYGEN SATURATION: 95 % | BODY MASS INDEX: 26.38 KG/M2

## 2022-12-27 DIAGNOSIS — C61 PROSTATE CANCER METASTATIC TO BONE (HCC): Primary | ICD-10-CM

## 2022-12-27 DIAGNOSIS — C79.51 PROSTATE CANCER METASTATIC TO BONE (HCC): Primary | ICD-10-CM

## 2022-12-27 LAB
ALBUMIN SERPL-MCNC: 4.1 GM/DL (ref 3.4–5)
ALP BLD-CCNC: 138 IU/L (ref 40–129)
ALT SERPL-CCNC: 13 U/L (ref 10–40)
ANION GAP SERPL CALCULATED.3IONS-SCNC: 10 MMOL/L (ref 4–16)
AST SERPL-CCNC: 15 IU/L (ref 15–37)
BASOPHILS ABSOLUTE: 0 K/CU MM
BASOPHILS RELATIVE PERCENT: 0.1 % (ref 0–1)
BILIRUB SERPL-MCNC: 0.4 MG/DL (ref 0–1)
BUN BLDV-MCNC: 20 MG/DL (ref 6–23)
CALCIUM SERPL-MCNC: 9.5 MG/DL (ref 8.3–10.6)
CHLORIDE BLD-SCNC: 105 MMOL/L (ref 99–110)
CO2: 27 MMOL/L (ref 21–32)
CREAT SERPL-MCNC: 1.1 MG/DL (ref 0.9–1.3)
DIFFERENTIAL TYPE: ABNORMAL
EOSINOPHILS ABSOLUTE: 0 K/CU MM
EOSINOPHILS RELATIVE PERCENT: 0 % (ref 0–3)
GFR SERPL CREATININE-BSD FRML MDRD: >60 ML/MIN/1.73M2
GLUCOSE BLD-MCNC: 97 MG/DL (ref 70–99)
HCT VFR BLD CALC: 43.1 % (ref 42–52)
HEMOGLOBIN: 13.9 GM/DL (ref 13.5–18)
LYMPHOCYTES ABSOLUTE: 1.5 K/CU MM
LYMPHOCYTES RELATIVE PERCENT: 11 % (ref 24–44)
MCH RBC QN AUTO: 27.1 PG (ref 27–31)
MCHC RBC AUTO-ENTMCNC: 32.3 % (ref 32–36)
MCV RBC AUTO: 84.2 FL (ref 78–100)
MONOCYTES ABSOLUTE: 1.2 K/CU MM
MONOCYTES RELATIVE PERCENT: 8.7 % (ref 0–4)
PDW BLD-RTO: 15.7 % (ref 11.7–14.9)
PLATELET # BLD: 232 K/CU MM (ref 140–440)
PMV BLD AUTO: 10.8 FL (ref 7.5–11.1)
POTASSIUM SERPL-SCNC: 4.1 MMOL/L (ref 3.5–5.1)
PSA ULTRASENSITIVE: 45.25 NG/ML (ref 0–4)
RBC # BLD: 5.12 M/CU MM (ref 4.6–6.2)
SEGMENTED NEUTROPHILS ABSOLUTE COUNT: 10.7 K/CU MM
SEGMENTED NEUTROPHILS RELATIVE PERCENT: 80.2 % (ref 36–66)
SODIUM BLD-SCNC: 142 MMOL/L (ref 135–145)
TOTAL PROTEIN: 6.6 GM/DL (ref 6.4–8.2)
WBC # BLD: 13.4 K/CU MM (ref 4–10.5)

## 2022-12-27 PROCEDURE — 85025 COMPLETE CBC W/AUTO DIFF WBC: CPT

## 2022-12-27 PROCEDURE — 96413 CHEMO IV INFUSION 1 HR: CPT

## 2022-12-27 PROCEDURE — 96415 CHEMO IV INFUSION ADDL HR: CPT

## 2022-12-27 PROCEDURE — 6360000002 HC RX W HCPCS: Performed by: NURSE PRACTITIONER

## 2022-12-27 PROCEDURE — 2500000003 HC RX 250 WO HCPCS: Performed by: NURSE PRACTITIONER

## 2022-12-27 PROCEDURE — 84153 ASSAY OF PSA TOTAL: CPT

## 2022-12-27 PROCEDURE — 80053 COMPREHEN METABOLIC PANEL: CPT

## 2022-12-27 PROCEDURE — 1123F ACP DISCUSS/DSCN MKR DOCD: CPT | Performed by: INTERNAL MEDICINE

## 2022-12-27 PROCEDURE — 96375 TX/PRO/DX INJ NEW DRUG ADDON: CPT

## 2022-12-27 PROCEDURE — 2580000003 HC RX 258: Performed by: NURSE PRACTITIONER

## 2022-12-27 PROCEDURE — 99214 OFFICE O/P EST MOD 30 MIN: CPT | Performed by: INTERNAL MEDICINE

## 2022-12-27 RX ORDER — ALBUTEROL SULFATE 90 UG/1
4 AEROSOL, METERED RESPIRATORY (INHALATION) PRN
Status: CANCELLED | OUTPATIENT
Start: 2022-12-27

## 2022-12-27 RX ORDER — SODIUM CHLORIDE 0.9 % (FLUSH) 0.9 %
5-40 SYRINGE (ML) INJECTION PRN
Status: DISCONTINUED | OUTPATIENT
Start: 2022-12-27 | End: 2022-12-28 | Stop reason: HOSPADM

## 2022-12-27 RX ORDER — DEXAMETHASONE SODIUM PHOSPHATE 4 MG/ML
4 INJECTION, SOLUTION INTRA-ARTICULAR; INTRALESIONAL; INTRAMUSCULAR; INTRAVENOUS; SOFT TISSUE ONCE
Status: DISCONTINUED | OUTPATIENT
Start: 2022-12-27 | End: 2022-12-27 | Stop reason: CLARIF

## 2022-12-27 RX ORDER — FAMOTIDINE 10 MG/ML
20 INJECTION, SOLUTION INTRAVENOUS
Status: CANCELLED | OUTPATIENT
Start: 2022-12-27

## 2022-12-27 RX ORDER — SODIUM CHLORIDE 9 MG/ML
5-250 INJECTION, SOLUTION INTRAVENOUS PRN
Status: DISCONTINUED | OUTPATIENT
Start: 2022-12-27 | End: 2022-12-28 | Stop reason: HOSPADM

## 2022-12-27 RX ORDER — DEXAMETHASONE SODIUM PHOSPHATE 4 MG/ML
8 INJECTION, SOLUTION INTRA-ARTICULAR; INTRALESIONAL; INTRAMUSCULAR; INTRAVENOUS; SOFT TISSUE ONCE
Status: DISCONTINUED | OUTPATIENT
Start: 2022-12-27 | End: 2022-12-27 | Stop reason: CLARIF

## 2022-12-27 RX ORDER — FAMOTIDINE 10 MG/ML
20 INJECTION, SOLUTION INTRAVENOUS ONCE
Status: COMPLETED | OUTPATIENT
Start: 2022-12-27 | End: 2022-12-27

## 2022-12-27 RX ORDER — DEXAMETHASONE SODIUM PHOSPHATE 4 MG/ML
4 INJECTION, SOLUTION INTRA-ARTICULAR; INTRALESIONAL; INTRAMUSCULAR; INTRAVENOUS; SOFT TISSUE ONCE
Status: COMPLETED | OUTPATIENT
Start: 2022-12-27 | End: 2022-12-27

## 2022-12-27 RX ORDER — EPINEPHRINE 1 MG/ML
0.3 INJECTION, SOLUTION, CONCENTRATE INTRAVENOUS PRN
Status: CANCELLED | OUTPATIENT
Start: 2022-12-27

## 2022-12-27 RX ORDER — ONDANSETRON 2 MG/ML
8 INJECTION INTRAMUSCULAR; INTRAVENOUS
Status: CANCELLED | OUTPATIENT
Start: 2022-12-27

## 2022-12-27 RX ORDER — SODIUM CHLORIDE 9 MG/ML
5-250 INJECTION, SOLUTION INTRAVENOUS PRN
Status: CANCELLED | OUTPATIENT
Start: 2022-12-27

## 2022-12-27 RX ORDER — DIPHENHYDRAMINE HYDROCHLORIDE 50 MG/ML
50 INJECTION INTRAMUSCULAR; INTRAVENOUS ONCE
Status: COMPLETED | OUTPATIENT
Start: 2022-12-27 | End: 2022-12-27

## 2022-12-27 RX ORDER — SODIUM CHLORIDE 9 MG/ML
INJECTION, SOLUTION INTRAVENOUS CONTINUOUS
Status: CANCELLED | OUTPATIENT
Start: 2022-12-27

## 2022-12-27 RX ORDER — HEPARIN SODIUM (PORCINE) LOCK FLUSH IV SOLN 100 UNIT/ML 100 UNIT/ML
500 SOLUTION INTRAVENOUS PRN
Status: CANCELLED | OUTPATIENT
Start: 2022-12-27

## 2022-12-27 RX ORDER — SODIUM CHLORIDE 9 MG/ML
5-40 INJECTION INTRAVENOUS PRN
Status: CANCELLED | OUTPATIENT
Start: 2022-12-27

## 2022-12-27 RX ORDER — DEXAMETHASONE SODIUM PHOSPHATE 10 MG/ML
8 INJECTION, SOLUTION INTRAMUSCULAR; INTRAVENOUS ONCE
Status: COMPLETED | OUTPATIENT
Start: 2022-12-27 | End: 2022-12-27

## 2022-12-27 RX ORDER — MEPERIDINE HYDROCHLORIDE 25 MG/ML
12.5 INJECTION INTRAMUSCULAR; INTRAVENOUS; SUBCUTANEOUS PRN
Status: CANCELLED | OUTPATIENT
Start: 2022-12-27

## 2022-12-27 RX ORDER — ACETAMINOPHEN 325 MG/1
650 TABLET ORAL
Status: CANCELLED | OUTPATIENT
Start: 2022-12-27

## 2022-12-27 RX ORDER — DEXAMETHASONE SODIUM PHOSPHATE 4 MG/ML
8 INJECTION, SOLUTION INTRA-ARTICULAR; INTRALESIONAL; INTRAMUSCULAR; INTRAVENOUS; SOFT TISSUE ONCE
Status: COMPLETED | OUTPATIENT
Start: 2022-12-27 | End: 2022-12-27

## 2022-12-27 RX ORDER — DIPHENHYDRAMINE HYDROCHLORIDE 50 MG/ML
50 INJECTION INTRAMUSCULAR; INTRAVENOUS
Status: CANCELLED | OUTPATIENT
Start: 2022-12-27

## 2022-12-27 RX ADMIN — DEXAMETHASONE SODIUM PHOSPHATE 8 MG: 10 INJECTION, SOLUTION INTRAMUSCULAR; INTRAVENOUS at 11:55

## 2022-12-27 RX ADMIN — DEXAMETHASONE SODIUM PHOSPHATE 4 MG: 4 INJECTION, SOLUTION INTRA-ARTICULAR; INTRALESIONAL; INTRAMUSCULAR; INTRAVENOUS; SOFT TISSUE at 11:55

## 2022-12-27 RX ADMIN — FAMOTIDINE 20 MG: 10 INJECTION, SOLUTION INTRAVENOUS at 12:56

## 2022-12-27 RX ADMIN — DEXAMETHASONE SODIUM PHOSPHATE 8 MG: 4 INJECTION, SOLUTION INTRAMUSCULAR; INTRAVENOUS at 10:41

## 2022-12-27 RX ADMIN — DIPHENHYDRAMINE HYDROCHLORIDE 50 MG: 50 INJECTION INTRAMUSCULAR; INTRAVENOUS at 10:41

## 2022-12-27 RX ADMIN — SODIUM CHLORIDE 149.2 MG: 9 INJECTION, SOLUTION INTRAVENOUS at 11:28

## 2022-12-27 RX ADMIN — FAMOTIDINE 20 MG: 10 INJECTION, SOLUTION INTRAVENOUS at 10:40

## 2022-12-27 NOTE — PROGRESS NOTES
Docetaxel administered as ordered. Call light within reach. Brother at chairside. Instructed patient to notify nursing staff with any issues or concerns. At 1153, Patient developed facial flushing and some SOB. Chemo infusion stopped. NS wide open. VS /81, HR 81, Resp 18, O2 Sats 95% RA. Patient reports some SOB, Oxygen 2L applied for comfort. Dr. Guo Blind at chairside. At 1125, Decadron 12 mg administered IV push. At 1215, VS /82, HR 76, O2 Sats 100% on 2L. At [id number], Patient back to baseline, all issues resolved. Oxygen removed. Docetaxol restarted at 25ml/hr for 30 min then titrated up to max rate of 171 ml/hr. Completed remaining infusion without experiencing any issues. Patient reminded to bring inhalers for his COPD to chemo appt      Instructed patient to notify office with any questions or unmanageable issues. AVS provided. Discharged in stable condition, ambulatory.

## 2022-12-27 NOTE — PROGRESS NOTES
MA Rooming Questions  Patient: Rocio Aly  MRN: [id number]    Date: 12/27/2022        1. Do you have any new issues?   no         2. Do you need any refills on medications? Yes - Dexamethasone    3. Have you had any imaging done since your last visit?   no    4. Have you been hospitalized or seen in the emergency room since your last visit here?   no    5. Did the patient have a depression screening completed today?  No    No data recorded     PHQ-9 Given to (if applicable):               PHQ-9 Score (if applicable):                     [] Positive     []  Negative              Does question #9 need addressed (if applicable)                     [] Yes    []  No               Jordi Moore CMA

## 2023-01-03 NOTE — PROGRESS NOTES
Patient Name:  Eliazar Machado  Patient :  1950  Patient MRN:  [id number]     Primary Oncologist: Asia Francois MD  Referring Provider: Agus Leung MD     Date of Service: 2023     Chief Complaint:    Chief Complaint   Patient presents with    Follow-up    Chemotherapy     Patient's active problem list:        Metastatic prostate cancer    HPI:   Eliazar Machado is a 72-year-old very pleasant gentleman with medical history significant for hypertension, hyperlipidemia, COPD, GERD, osteoarthritis and history of kidney stone, initially referred to me on 2022 for evaluation of metastatic prostate cancer. He initially presented to [facility] on 22 with abdominal pain and PVR was about 9 liter. CT scan on 22 showed cystitis, b/l moderate to severe HN, b/l renal stones. All the renal stones are non obstructive and his ARF resolved with koch catheter. He was also noted to have significant elevation in PSA (331 ng/ml on 22). It was 150 on 2022 and 5 on 2019. RACHEL at that time showed rock hard large prostate. MRI pelvis showed diffuse tumor involvement of the prostate gland, with loss of zonal anatomy, extraprostatic extension and involvement of the seminal vesicles and suspected involvement of the neurovascular bundles, PI-RADS 5. Enlarged pelvic and retroperitoneal lymph nodes consistent with willow metastasis. A larger right common iliac chain lymph node results in mass-effect on the right common iliac vein. Early mucosa enhancement of the urinary bladder. Differential includes a cystitis although involvement of the urinary bladder is not excluded given extensive involvement of the prostate gland. He has right lower extremity swelling. Doppler study was negative for DVT and Echo showed normal EF. RLE swelling is most likely due to mass effect from enlarged lymph node to right common iliac vein.      Bone scan done on 2022 showed findings concerning for metastatic disease within the T10 vertebral body. Findings consistent with degenerative changes in the spine and multiple joints and possibly an old fracture in the left rib cage. He is scheduled for TURP to get tissue diagnosis and to release urine outflow obstruction on 10/13/22. Dr. Fox Swanson started him on proscar and casodex on 8/19/22. He underwent transurethral resection of prostate cancer on 10/13/22 and pathology showed prostate adenocarcinoma, fabi score 4+4=8, (grade group 4, 12/15 cores, involving approximately 30% of tissue present. Dr. Fox Swanson started ADT with eligard since early November 2022. Chemotherapy with docetaxel and abiraterone were started on 12/5/22. On January 16, 2023, he presented to me for follow-up. I have been following him for prostate cancer. Clinically, he has castration sensitive prostate cancer, grade group 4 and I reviewed with him all the treatment options. I recommend ADT with docetaxel and abiraterone. Reviewed with him all the potential side effects as well as benefits of the therapy. After long discussion, he is in agreement with the plans. I discussed his case with Dr. Fox Swanson and Dallis  was started early November 2022. Docetaxel and abiraterone were started on 12/5/2022. He is s/p second cycle of docetaxel and he is tolerating it well. He has mild reaction on docetaxel with every cycle but able to finish it. Will give his third cycle today. I will continue to follow him closely during chemotherapy. He stated that he is feeling some improvement after TURP and ADT. Chemo induced nausea - recommend him to take zofran as needed basis. Hypertension - his SBP is mildly elevated. Recommend to continue with metoprolol and amlodipnie. Bronchial asthma - he is on albuterol INH and wixela inbub. He doesn't have any new complaints on today visit. Past Medical History:     Significant for  1. Hypertension  2. Hyperlipidemia  3. COPD  4. GERD  5. Osteoarthritis  6. History of kidney stone    Past Surgery History:    Significant for  1. Laparoscopic cholecystectomy  2. Right leg surgery  3. Tonsillectomy                                                                          4.  Endoscopy with dilatation    Social History:   He is a former smoker and he quit smoking in 7/10/2015. He used to smoke 2 packs a day for approximately 45 years. He denies alcohol drinking or illicit drug abuse. Family History:    Significant for COPD and CHF in his mother. No Known Allergies    Review of Systems: \"Per interval history; otherwise 10 point ROS is negative. \"  His energy level is fair today and his sleep is fine. He doesn't have fever, chills, night sweats, cough, shortness of breath, chest pain, hemoptysis or palpitations. His bowel and bladder functions are normal. He denies nausea, vomiting, abdominal pain, diarrhea, constipation, dysuria, loss of appetite or weight loss. He denies neuropathy and he doesn't have bleeding or clotting issues. He denies any pain in his body. No anxiety or depression. The rest of the systems are unremarkable.      Vital Signs: BP (!) 153/85 (Site: Left Upper Arm, Position: Sitting, Cuff Size: Medium Adult)   Pulse 82   Temp 97.3 °F (36.3 °C) (Infrared)   Resp 16   Ht 6' (1.829 m)   Wt 201 lb (91.2 kg)   SpO2 97%   BMI 27.26 kg/m²      Physical Exam:  CONSTITUTIONAL: awake, alert, cooperative, no apparent distress   EYES: pupils equal, round and reactive to light, sclera clear, normal conjunctiva  ENT: Normocephalic, without obvious abnormality, atraumatic  NECK: supple, symmetrical, no jugular venous distension, no carotid bruits   HEMATOLOGIC/LYMPHATIC: no cervical, supraclavicular or axillary lymphadenopathy   LUNGS: VBS, no wheezes, clear to auscultation, no crackles, no increased work of breathing, no rhonchi,    CARDIOVASCULAR: regular rate and rhythm, normal S1 and S2, no murmur noted  ABDOMEN: normal bowel sounds x 4, soft, non-distended, non-tender, no masses palpated, no hepatosplenomegaly   MUSCULOSKELETAL: full range of motion noted, tone is normal  NEUROLOGIC: awake, alert, oriented to name, place and time. Motor skills grossly intact. SKIN: appears intact, normal skin color, normal texture, normal turgor, no jaundice.    EXTREMITIES: RLE swelling +, no clubbing, no cyanosis,        Labs:  Hematology:  Lab Results   Component Value Date    WBC 16.2 (H) 01/16/2023    RBC 4.98 01/16/2023    HGB 13.6 01/16/2023    HCT 41.8 (L) 01/16/2023    MCV 83.9 01/16/2023    MCH 27.3 01/16/2023    MCHC 32.5 01/16/2023    RDW 16.0 (H) 01/16/2023     01/16/2023    MPV 10.3 01/16/2023    SEGSPCT 90.6 (H) 01/16/2023    EOSRELPCT 0.1 01/16/2023    BASOPCT 0.2 01/16/2023    LYMPHOPCT 7.2 (L) 01/16/2023    MONOPCT 1.9 01/16/2023    SEGSABS 14.7 01/16/2023    EOSABS 0.0 01/16/2023    BASOSABS 0.0 01/16/2023    LYMPHSABS 1.2 01/16/2023    MONOSABS 0.3 01/16/2023    DIFFTYPE AUTOMATED DIFFERENTIAL 01/16/2023     No results found for: ESR  Chemistry:  Lab Results   Component Value Date     01/16/2023    K 4.2 01/16/2023     01/16/2023    CO2 25 01/16/2023    BUN 13 01/16/2023    CREATININE 1.0 01/16/2023    GLUCOSE 115 (H) 01/16/2023    CALCIUM 9.1 01/16/2023    PROT 6.5 01/16/2023    LABALBU 3.9 01/16/2023    BILITOT 0.4 01/16/2023    ALKPHOS 161 (H) 01/16/2023    AST 17 01/16/2023    ALT 15 01/16/2023    LABGLOM >60 01/16/2023     No results found for: MMA, LDH, HOMOCYSTEINE  No components found for: LD  No results found for: TSHHS, T4FREE, FT3  Immunology:  Lab Results   Component Value Date    PROT 6.5 01/16/2023     No results found for: Ana Soto, KLFLCR  No results found for: B2M  Coagulation Panel:  No results found for: PROTIME, INR, APTT, DDIMER  Anemia Panel:  No results found for: [id number], FOLATE  Tumor Markers:  Lab Results   Component Value Date    PSA 63.7 (H) 10/27/2022        Observations:  No data recorded     Assessment   Castration naive metastatic prostate cancer    Plan:  Stoney Morejon is a 72-year-old very pleasant gentleman who initially presented to [facility] with urinary outlet obstruction.     Further work up showed that he has rock hard large prostate on RACHEL with significantly elevated PSA (331).     MRI showed multiple abdominal and pelvic lymphadenopathy. One of the right pelvic lymph node has mass effect of right common iliac vein and causing him significant right leg swelling. Bone scan showed metastatic disease in T10 vertebra body.     He is scheduled for TURP to get tissue diagnosis and to release urine outflow obstruction on 10/13/22. Dr. Lenz started him on proscar and casodex on 8/19/22.     He underwent transurethral resection of prostate cancer on 10/13/22 and pathology showed prostate adenocarcinoma, fabi score 4+4=8, (grade group 4, 12/15 cores, involving approximately 30% of tissue present.     Dr. Lenz started ADT with eligard since early November 2022.     Chemotherapy with docetaxel and abiraterone were started on 12/5/22.     On January 16, 2023, he presented to me for follow-up. I have been following him for prostate cancer.    Clinically, he has castration sensitive prostate cancer, grade group 4 and I reviewed with him all the treatment options.     I recommend ADT with docetaxel and abiraterone. Reviewed with him all the potential side effects as well as benefits of the therapy.     After long discussion, he is in agreement with the plans. I discussed his case with Dr. Lenz and Eligard was started early November 2022.     Docetaxel and abiraterone were started on 12/5/2022. He is s/p second cycle of docetaxel and he is tolerating it well. He has mild reaction on docetaxel with every cycle but able to finish it. Will give his third cycle today.     I will continue to follow him closely during chemotherapy. He stated that he is  feeling some improvement after TURP and ADT. Chemo induced nausea - recommend him to take zofran as needed basis. Hypertension - his SBP is mildly elevated. Recommend to continue with metoprolol and amlodipnie. Bronchial asthma - he is on albuterol INH and wixela inbub. I answered all his questions and concerns for today. Recent imaging and labs were reviewed and discussed with the patient.

## 2023-01-13 RX ORDER — MEPERIDINE HYDROCHLORIDE 25 MG/ML
12.5 INJECTION INTRAMUSCULAR; INTRAVENOUS; SUBCUTANEOUS PRN
Status: CANCELLED | OUTPATIENT
Start: 2023-01-16

## 2023-01-13 RX ORDER — SODIUM CHLORIDE 9 MG/ML
5-40 INJECTION INTRAVENOUS PRN
Status: CANCELLED | OUTPATIENT
Start: 2023-01-16

## 2023-01-13 RX ORDER — SODIUM CHLORIDE 9 MG/ML
5-250 INJECTION, SOLUTION INTRAVENOUS PRN
Status: CANCELLED | OUTPATIENT
Start: 2023-01-16

## 2023-01-13 RX ORDER — EPINEPHRINE 1 MG/ML
0.3 INJECTION, SOLUTION, CONCENTRATE INTRAVENOUS PRN
Status: CANCELLED | OUTPATIENT
Start: 2023-01-16

## 2023-01-13 RX ORDER — DIPHENHYDRAMINE HYDROCHLORIDE 50 MG/ML
50 INJECTION INTRAMUSCULAR; INTRAVENOUS
Status: CANCELLED | OUTPATIENT
Start: 2023-01-16

## 2023-01-13 RX ORDER — FAMOTIDINE 10 MG/ML
20 INJECTION, SOLUTION INTRAVENOUS
Status: CANCELLED | OUTPATIENT
Start: 2023-01-16

## 2023-01-13 RX ORDER — HEPARIN SODIUM (PORCINE) LOCK FLUSH IV SOLN 100 UNIT/ML 100 UNIT/ML
500 SOLUTION INTRAVENOUS PRN
Status: CANCELLED | OUTPATIENT
Start: 2023-01-16

## 2023-01-13 RX ORDER — ACETAMINOPHEN 325 MG/1
650 TABLET ORAL
Status: CANCELLED | OUTPATIENT
Start: 2023-01-16

## 2023-01-13 RX ORDER — SODIUM CHLORIDE 9 MG/ML
INJECTION, SOLUTION INTRAVENOUS CONTINUOUS
Status: CANCELLED | OUTPATIENT
Start: 2023-01-16

## 2023-01-13 RX ORDER — ONDANSETRON 2 MG/ML
8 INJECTION INTRAMUSCULAR; INTRAVENOUS
Status: CANCELLED | OUTPATIENT
Start: 2023-01-16

## 2023-01-13 RX ORDER — SODIUM CHLORIDE 0.9 % (FLUSH) 0.9 %
5-40 SYRINGE (ML) INJECTION PRN
Status: CANCELLED | OUTPATIENT
Start: 2023-01-16

## 2023-01-13 RX ORDER — ALBUTEROL SULFATE 90 UG/1
4 AEROSOL, METERED RESPIRATORY (INHALATION) PRN
Status: CANCELLED | OUTPATIENT
Start: 2023-01-16

## 2023-01-16 ENCOUNTER — HOSPITAL ENCOUNTER (OUTPATIENT)
Dept: INFUSION THERAPY | Age: 73
Discharge: HOME | End: 2023-01-16
Payer: COMMERCIAL

## 2023-01-16 ENCOUNTER — OFFICE VISIT (OUTPATIENT)
Dept: ONCOLOGY | Age: 73
End: 2023-01-16
Payer: COMMERCIAL

## 2023-01-16 VITALS
OXYGEN SATURATION: 97 % | WEIGHT: 201 LBS | RESPIRATION RATE: 16 BRPM | HEIGHT: 72 IN | BODY MASS INDEX: 27.22 KG/M2 | DIASTOLIC BLOOD PRESSURE: 85 MMHG | SYSTOLIC BLOOD PRESSURE: 153 MMHG | TEMPERATURE: 97.3 F | HEART RATE: 82 BPM

## 2023-01-16 VITALS
BODY MASS INDEX: 27.22 KG/M2 | HEART RATE: 82 BPM | HEIGHT: 72 IN | DIASTOLIC BLOOD PRESSURE: 85 MMHG | OXYGEN SATURATION: 97 % | SYSTOLIC BLOOD PRESSURE: 153 MMHG | RESPIRATION RATE: 16 BRPM | TEMPERATURE: 97.3 F | WEIGHT: 201 LBS

## 2023-01-16 DIAGNOSIS — C79.51 PROSTATE CANCER METASTATIC TO BONE (HCC): Primary | ICD-10-CM

## 2023-01-16 DIAGNOSIS — C61 PROSTATE CANCER METASTATIC TO BONE (HCC): Primary | ICD-10-CM

## 2023-01-16 LAB
ALBUMIN SERPL-MCNC: 3.9 GM/DL (ref 3.4–5)
ALP BLD-CCNC: 161 IU/L (ref 40–129)
ALT SERPL-CCNC: 15 U/L (ref 10–40)
ANION GAP SERPL CALCULATED.3IONS-SCNC: 14 MMOL/L (ref 4–16)
AST SERPL-CCNC: 17 IU/L (ref 15–37)
BASOPHILS ABSOLUTE: 0 K/CU MM
BASOPHILS RELATIVE PERCENT: 0.2 % (ref 0–1)
BILIRUB SERPL-MCNC: 0.4 MG/DL (ref 0–1)
BUN BLDV-MCNC: 13 MG/DL (ref 6–23)
CALCIUM SERPL-MCNC: 9.1 MG/DL (ref 8.3–10.6)
CHLORIDE BLD-SCNC: 104 MMOL/L (ref 99–110)
CO2: 25 MMOL/L (ref 21–32)
CREAT SERPL-MCNC: 1 MG/DL (ref 0.9–1.3)
DIFFERENTIAL TYPE: ABNORMAL
EOSINOPHILS ABSOLUTE: 0 K/CU MM
EOSINOPHILS RELATIVE PERCENT: 0.1 % (ref 0–3)
GFR SERPL CREATININE-BSD FRML MDRD: >60 ML/MIN/1.73M2
GLUCOSE BLD-MCNC: 115 MG/DL (ref 70–99)
HCT VFR BLD CALC: 41.8 % (ref 42–52)
HEMOGLOBIN: 13.6 GM/DL (ref 13.5–18)
LYMPHOCYTES ABSOLUTE: 1.2 K/CU MM
LYMPHOCYTES RELATIVE PERCENT: 7.2 % (ref 24–44)
MCH RBC QN AUTO: 27.3 PG (ref 27–31)
MCHC RBC AUTO-ENTMCNC: 32.5 % (ref 32–36)
MCV RBC AUTO: 83.9 FL (ref 78–100)
MONOCYTES ABSOLUTE: 0.3 K/CU MM
MONOCYTES RELATIVE PERCENT: 1.9 % (ref 0–4)
PDW BLD-RTO: 16 % (ref 11.7–14.9)
PLATELET # BLD: 275 K/CU MM (ref 140–440)
PMV BLD AUTO: 10.3 FL (ref 7.5–11.1)
POTASSIUM SERPL-SCNC: 4.2 MMOL/L (ref 3.5–5.1)
PSA ULTRASENSITIVE: 27.3 NG/ML (ref 0–4)
RBC # BLD: 4.98 M/CU MM (ref 4.6–6.2)
SEGMENTED NEUTROPHILS ABSOLUTE COUNT: 14.7 K/CU MM
SEGMENTED NEUTROPHILS RELATIVE PERCENT: 90.6 % (ref 36–66)
SODIUM BLD-SCNC: 143 MMOL/L (ref 135–145)
TOTAL PROTEIN: 6.5 GM/DL (ref 6.4–8.2)
WBC # BLD: 16.2 K/CU MM (ref 4–10.5)

## 2023-01-16 PROCEDURE — 96367 TX/PROPH/DG ADDL SEQ IV INF: CPT

## 2023-01-16 PROCEDURE — 96375 TX/PRO/DX INJ NEW DRUG ADDON: CPT

## 2023-01-16 PROCEDURE — 1123F ACP DISCUSS/DSCN MKR DOCD: CPT | Performed by: INTERNAL MEDICINE

## 2023-01-16 PROCEDURE — 85025 COMPLETE CBC W/AUTO DIFF WBC: CPT

## 2023-01-16 PROCEDURE — 6360000002 HC RX W HCPCS: Performed by: NURSE PRACTITIONER

## 2023-01-16 PROCEDURE — 96415 CHEMO IV INFUSION ADDL HR: CPT

## 2023-01-16 PROCEDURE — 96413 CHEMO IV INFUSION 1 HR: CPT

## 2023-01-16 PROCEDURE — 2500000003 HC RX 250 WO HCPCS: Performed by: NURSE PRACTITIONER

## 2023-01-16 PROCEDURE — 99214 OFFICE O/P EST MOD 30 MIN: CPT | Performed by: INTERNAL MEDICINE

## 2023-01-16 PROCEDURE — 80053 COMPREHEN METABOLIC PANEL: CPT

## 2023-01-16 PROCEDURE — 84153 ASSAY OF PSA TOTAL: CPT

## 2023-01-16 PROCEDURE — 6360000002 HC RX W HCPCS

## 2023-01-16 PROCEDURE — 2580000003 HC RX 258: Performed by: NURSE PRACTITIONER

## 2023-01-16 RX ORDER — FAMOTIDINE 10 MG/ML
20 INJECTION, SOLUTION INTRAVENOUS ONCE
Status: COMPLETED | OUTPATIENT
Start: 2023-01-16 | End: 2023-01-16

## 2023-01-16 RX ORDER — DIPHENHYDRAMINE HYDROCHLORIDE 50 MG/ML
50 INJECTION INTRAMUSCULAR; INTRAVENOUS ONCE
Status: COMPLETED | OUTPATIENT
Start: 2023-01-16 | End: 2023-01-16

## 2023-01-16 RX ORDER — CEFUROXIME AXETIL 500 MG/1
500 TABLET ORAL 2 TIMES DAILY
COMMUNITY

## 2023-01-16 RX ORDER — SODIUM CHLORIDE 9 MG/ML
5-250 INJECTION, SOLUTION INTRAVENOUS PRN
Status: DISCONTINUED | OUTPATIENT
Start: 2023-01-16 | End: 2023-01-17 | Stop reason: HOSPADM

## 2023-01-16 RX ORDER — DEXAMETHASONE 4 MG/1
TABLET ORAL
Qty: 12 TABLET | Refills: 5 | Status: CANCELLED | OUTPATIENT
Start: 2023-01-16

## 2023-01-16 RX ORDER — DEXAMETHASONE 4 MG/1
TABLET ORAL
Qty: 12 TABLET | Refills: 5 | Status: SHIPPED | OUTPATIENT
Start: 2023-01-16

## 2023-01-16 RX ADMIN — SODIUM CHLORIDE 149 MG: 9 INJECTION, SOLUTION INTRAVENOUS at 10:37

## 2023-01-16 RX ADMIN — DIPHENHYDRAMINE HYDROCHLORIDE 50 MG: 50 INJECTION INTRAMUSCULAR; INTRAVENOUS at 09:55

## 2023-01-16 RX ADMIN — DEXAMETHASONE SODIUM PHOSPHATE 20 MG: 4 INJECTION INTRA-ARTICULAR; INTRALESIONAL; INTRAMUSCULAR; INTRAVENOUS; SOFT TISSUE at 10:02

## 2023-01-16 RX ADMIN — FAMOTIDINE 20 MG: 10 INJECTION, SOLUTION INTRAVENOUS at 09:55

## 2023-01-16 RX ADMIN — SODIUM CHLORIDE 20 ML/HR: 9 INJECTION, SOLUTION INTRAVENOUS at 10:00

## 2023-01-16 NOTE — PROGRESS NOTES
MA Rooming Questions  Patient: Ramonita Bates  MRN: [id number]    Date: 1/16/2023        1. Do you have any new issues?   no         2. Do you need any refills on medications?    no    3. Have you had any imaging done since your last visit?   no    4. Have you been hospitalized or seen in the emergency room since your last visit here?   no    5. Did the patient have a depression screening completed today?  No    No data recorded     PHQ-9 Given to (if applicable):               PHQ-9 Score (if applicable):                     [] Positive     []  Negative              Does question #9 need addressed (if applicable)                     [] Yes    []  No               Roz Johnson CMA

## 2023-01-16 NOTE — PROGRESS NOTES
Ambulated to infusion area, here today for chemotherapy. No concerns at this time. Patient reports he is on antibiotics that were prescribed for a bladder infection last week by Dr. Kevyn See. BP increased today at 164/96, however patient reports that he did not take his BP medications as he was out of them. PIV placed in left wrist, positive blood return noted. Labs drawn. Labs within defined limits. Chemo administered as ordered. Call light within reach. At 1046: patient reported feeling SOB, and flushed, using inhaler at this time. Infusion stopped, and Dr. Negrito Campbell alerted of reaction BS: /88, P, o2 sat 94%. 1047, Dr. Negrito Campbell at chairside, wheezing noted throughout. Will give 100mg Solucortef at this time. 1048, Solucortef pushed by this nurse. 1054, VS rechecked: /85, P 82, o2 sat 97%. 1057, reports feeling better, no longer flushed or as SOB.  mL/hr for 20 minutes    1125, discussed with Dr. Negrito Campbell, will start titrating patient for Taxotere. Restarted at 25mL/hr and doubled every 20-25 minutes. Max infusion rate reached, tolerated remainder of infusion without incident. Discharge instructions provided. RTC 02/06 for next infusion. Status appropriately assessed and documented. All required labs and results reviewed. Treatment approved by provider. Treatment orders and medications verified by 2 Registered Nurses where applicable. Treatment plan was confirmed with patient prior to administration, and educated the need to report any treatment-related symptoms      Prior to administration, when applicable, the following 8 elements of medication administration were reviewed with 2nd Registered Nurse prior to dosing: drug name, drug dose, infusion volume when prepared in a syringe, rate of administration, expiration dates and/or times, appearance and integrity of drug(s), and rate of pump for infusion. The 5 rights of medication administration have been verified.

## 2023-01-23 NOTE — PROGRESS NOTES
Patient Name:  Jeanna Ram  Patient :  1950  Patient MRN:  [id number]     Primary Oncologist: Jorge Lucia MD  Referring Provider: Gertrude Cedillo MD     Date of Service: 2023     Chief Complaint:    Chief Complaint   Patient presents with    Follow-up    Chemotherapy     Patient's active problem list:        Metastatic prostate cancer    HPI:   Jeanna Ram is a 79-year-old very pleasant gentleman with medical history significant for hypertension, hyperlipidemia, COPD, GERD, osteoarthritis and history of kidney stone, initially referred to me on 2022 for evaluation of metastatic prostate cancer. He initially presented to [facility] on 22 with abdominal pain and PVR was about 9 liter. CT scan on 22 showed cystitis, b/l moderate to severe HN, b/l renal stones. All the renal stones are non obstructive and his ARF resolved with koch catheter. He was also noted to have significant elevation in PSA (331 ng/ml on 22). It was 150 on 2022 and 5 on 2019. RACHEL at that time showed rock hard large prostate. MRI pelvis showed diffuse tumor involvement of the prostate gland, with loss of zonal anatomy, extraprostatic extension and involvement of the seminal vesicles and suspected involvement of the neurovascular bundles, PI-RADS 5. Enlarged pelvic and retroperitoneal lymph nodes consistent with willow metastasis. A larger right common iliac chain lymph node results in mass-effect on the right common iliac vein. Early mucosa enhancement of the urinary bladder. Differential includes a cystitis although involvement of the urinary bladder is not excluded given extensive involvement of the prostate gland. He has right lower extremity swelling. Doppler study was negative for DVT and Echo showed normal EF. RLE swelling is most likely due to mass effect from enlarged lymph node to right common iliac vein.      Bone scan done on 2022 showed findings concerning for metastatic disease within the T10 vertebral body. Findings consistent with degenerative changes in the spine and multiple joints and possibly an old fracture in the left rib cage. He is scheduled for TURP to get tissue diagnosis and to release urine outflow obstruction on 10/13/22. Dr. Mihir Mancini started him on proscar and casodex on 8/19/22. He underwent transurethral resection of prostate cancer on 10/13/22 and pathology showed prostate adenocarcinoma, fabi score 4+4=8, (grade group 4, 12/15 cores, involving approximately 30% of tissue present. Dr. Mihir Mancini started ADT with eligard since early November 2022. Chemotherapy with docetaxel and abiraterone were started on 12/5/22. On February 6, 2023, he presented to me for follow-up. I have been following him for prostate cancer. Clinically, he has castration sensitive prostate cancer, grade group 4 and I reviewed with him all the treatment options. I recommend ADT with docetaxel and abiraterone. Reviewed with him all the potential side effects as well as benefits of the therapy. After long discussion, he is in agreement with the plans. I discussed his case with Dr. Mihir Mancini and Ricardo Sanchez was started early November 2022. Docetaxel and abiraterone were started on 12/5/2022. He is s/p third cycle of docetaxel and he is tolerating it well. He has mild reaction on docetaxel with every cycle but able to finish it. Will give his fourth cycle today. I will continue to follow him closely during chemotherapy. He stated that he is feeling some improvement after TURP and ADT. Chemo induced nausea - recommend him to take zofran as needed basis. Hypertension - his SBP is mildly elevated. Recommend to continue with metoprolol and amlodipnie. Bronchial asthma - he is on albuterol INH and wixela inbub. He doesn't have any new complaints on today visit. Past Medical History:     Significant for  1. Hypertension  2. Hyperlipidemia  3. COPD  4. GERD  5. Osteoarthritis  6. History of kidney stone    Past Surgery History:    Significant for  1. Laparoscopic cholecystectomy  2. Right leg surgery  3. Tonsillectomy                                                                          4.  Endoscopy with dilatation    Social History:   He is a former smoker and he quit smoking in 7/10/2015. He used to smoke 2 packs a day for approximately 45 years. He denies alcohol drinking or illicit drug abuse. Family History:    Significant for COPD and CHF in his mother. No Known Allergies    Review of Systems: \"Per interval history; otherwise 10 point ROS is negative. \"  His energy level is pretty good today and his sleep is fine. He doesn't have fever, chills, night sweats, cough, shortness of breath, chest pain, hemoptysis or palpitations. His bowel and bladder functions are normal. He denies nausea, vomiting, abdominal pain, diarrhea, constipation, dysuria, loss of appetite or weight loss. He doesn't have neuropathy and he denies bleeding or clotting issues. He denies any pain in his body. No anxiety or depression. The rest of the systems are unremarkable.      Vital Signs: /82 (Position: Sitting)   Pulse 84   Temp 97.4 °F (36.3 °C) (Temporal)   Resp 16   Ht 6' (1.829 m)   Wt 196 lb (88.9 kg)   SpO2 97%   BMI 26.58 kg/m²      Physical Exam:  CONSTITUTIONAL: awake, alert, cooperative, no apparent distress   EYES: pupils equal, round and reactive to light, sclera clear, normal conjunctiva  ENT: Normocephalic, without obvious abnormality, atraumatic  NECK: supple, symmetrical, no jugular venous distension, no carotid bruits   HEMATOLOGIC/LYMPHATIC: no cervical, supraclavicular or axillary lymphadenopathy   LUNGS: VBS, no wheezes, clear to auscultation, no crackles, no increased work of breathing, no rhonchi,    CARDIOVASCULAR: regular rate and rhythm, normal S1 and S2, no murmur noted  ABDOMEN: normal bowel sounds x 4, soft, non-distended, non-tender, no masses palpated, no hepatosplenomegaly   MUSCULOSKELETAL: full range of motion noted, tone is normal  NEUROLOGIC: awake, alert, oriented to name, place and time. Motor skills grossly intact. SKIN: appears intact, normal skin color, normal texture, normal turgor, no jaundice.    EXTREMITIES: no clubbing, RLE swelling +, no cyanosis,        Labs:  Hematology:  Lab Results   Component Value Date    WBC 17.1 (H) 02/06/2023    RBC 4.99 02/06/2023    HGB 13.6 02/06/2023    HCT 42.1 02/06/2023    MCV 84.4 02/06/2023    MCH 27.3 02/06/2023    MCHC 32.3 02/06/2023    RDW 17.7 (H) 02/06/2023     02/06/2023    MPV 10.5 02/06/2023    SEGSPCT 86.1 (H) 02/06/2023    EOSRELPCT 0.0 02/06/2023    BASOPCT 0.1 02/06/2023    LYMPHOPCT 8.1 (L) 02/06/2023    MONOPCT 5.7 (H) 02/06/2023    SEGSABS 14.7 02/06/2023    EOSABS 0.0 02/06/2023    BASOSABS 0.0 02/06/2023    LYMPHSABS 1.4 02/06/2023    MONOSABS 1.0 02/06/2023    DIFFTYPE AUTOMATED DIFFERENTIAL 02/06/2023     No results found for: ESR  Chemistry:  Lab Results   Component Value Date     02/06/2023    K 4.3 02/06/2023     02/06/2023    CO2 25 02/06/2023    BUN 22 02/06/2023    CREATININE 1.2 02/06/2023    GLUCOSE 110 (H) 02/06/2023    CALCIUM 9.5 02/06/2023    PROT 6.2 (L) 02/06/2023    LABALBU 4.0 02/06/2023    BILITOT 0.4 02/06/2023    ALKPHOS 147 (H) 02/06/2023    AST 16 02/06/2023    ALT 14 02/06/2023    LABGLOM >60 02/06/2023     No results found for: MMA, LDH, HOMOCYSTEINE  No components found for: LD  No results found for: TSHHS, T4FREE, FT3  Immunology:  Lab Results   Component Value Date    PROT 6.2 (L) 02/06/2023     No results found for: EDGRA Urbina  No results found for: B2M  Coagulation Panel:  No results found for: PROTIME, INR, APTT, DDIMER  Anemia Panel:  No results found for: [id number], FOLATE  Tumor Markers:  Lab Results   Component Value Date    PSA 63.7 (H) 10/27/2022        Observations:  No data recorded     Assessment   Castration naive metastatic prostate cancer    Plan:  Mike Allen is a 68-year-old very pleasant gentleman who initially presented to [facility] with urinary outlet obstruction. Further work up showed that he has rock hard large prostate on RACHEL with significantly elevated PSA (331). MRI showed multiple abdominal and pelvic lymphadenopathy. One of the right pelvic lymph node has mass effect of right common iliac vein and causing him significant right leg swelling. Bone scan showed metastatic disease in T10 vertebra body. He is scheduled for TURP to get tissue diagnosis and to release urine outflow obstruction on 10/13/22. Dr. Alton Rivera started him on proscar and casodex on 8/19/22. He underwent transurethral resection of prostate cancer on 10/13/22 and pathology showed prostate adenocarcinoma, fabi score 4+4=8, (grade group 4, 12/15 cores, involving approximately 30% of tissue present. Dr. Alton Rivera started ADT with eligard since early November 2022. Chemotherapy with docetaxel and abiraterone were started on 12/5/22. On February 6, 2023, he presented to me for follow-up. I have been following him for prostate cancer. Clinically, he has castration sensitive prostate cancer, grade group 4 and I reviewed with him all the treatment options. I recommend ADT with docetaxel and abiraterone. Reviewed with him all the potential side effects as well as benefits of the therapy. After long discussion, he is in agreement with the plans. I discussed his case with Dr. Alton Rivera and Mehuldeirdre Michelle was started early November 2022. Docetaxel and abiraterone were started on 12/5/2022. He is s/p third cycle of docetaxel and he is tolerating it well. He has mild reaction on docetaxel with every cycle but able to finish it. Will give his fourth cycle today. I will continue to follow him closely during chemotherapy.  He stated that he is feeling some improvement after TURP and ADT. Chemo induced nausea - recommend him to take zofran as needed basis. Hypertension - his SBP is mildly elevated. Recommend to continue with metoprolol and amlodipnie. Bronchial asthma - he is on albuterol INH and wixela inbub. I answered all his questions and concerns for today. Recent imaging and labs were reviewed and discussed with the patient.

## 2023-01-31 DIAGNOSIS — C61 PROSTATE CANCER METASTATIC TO BONE (HCC): Primary | ICD-10-CM

## 2023-01-31 DIAGNOSIS — C79.51 PROSTATE CANCER METASTATIC TO BONE (HCC): Primary | ICD-10-CM

## 2023-02-02 RX ORDER — FAMOTIDINE 10 MG/ML
20 INJECTION, SOLUTION INTRAVENOUS
Status: CANCELLED | OUTPATIENT
Start: 2023-02-06

## 2023-02-02 RX ORDER — SODIUM CHLORIDE 9 MG/ML
5-250 INJECTION, SOLUTION INTRAVENOUS PRN
Status: CANCELLED | OUTPATIENT
Start: 2023-02-06

## 2023-02-02 RX ORDER — SODIUM CHLORIDE 9 MG/ML
INJECTION, SOLUTION INTRAVENOUS CONTINUOUS
Status: CANCELLED | OUTPATIENT
Start: 2023-02-06

## 2023-02-02 RX ORDER — ACETAMINOPHEN 325 MG/1
650 TABLET ORAL
Status: CANCELLED | OUTPATIENT
Start: 2023-02-06

## 2023-02-02 RX ORDER — SODIUM CHLORIDE 9 MG/ML
5-40 INJECTION INTRAVENOUS PRN
Status: CANCELLED | OUTPATIENT
Start: 2023-02-06

## 2023-02-02 RX ORDER — EPINEPHRINE 1 MG/ML
0.3 INJECTION, SOLUTION, CONCENTRATE INTRAVENOUS PRN
Status: CANCELLED | OUTPATIENT
Start: 2023-02-06

## 2023-02-02 RX ORDER — DIPHENHYDRAMINE HYDROCHLORIDE 50 MG/ML
50 INJECTION INTRAMUSCULAR; INTRAVENOUS
Status: CANCELLED | OUTPATIENT
Start: 2023-02-06

## 2023-02-02 RX ORDER — ONDANSETRON 2 MG/ML
8 INJECTION INTRAMUSCULAR; INTRAVENOUS
Status: CANCELLED | OUTPATIENT
Start: 2023-02-06

## 2023-02-02 RX ORDER — HEPARIN SODIUM (PORCINE) LOCK FLUSH IV SOLN 100 UNIT/ML 100 UNIT/ML
500 SOLUTION INTRAVENOUS PRN
Status: CANCELLED | OUTPATIENT
Start: 2023-02-06

## 2023-02-02 RX ORDER — ALBUTEROL SULFATE 90 UG/1
4 AEROSOL, METERED RESPIRATORY (INHALATION) PRN
Status: CANCELLED | OUTPATIENT
Start: 2023-02-06

## 2023-02-02 RX ORDER — MEPERIDINE HYDROCHLORIDE 25 MG/ML
12.5 INJECTION INTRAMUSCULAR; INTRAVENOUS; SUBCUTANEOUS PRN
Status: CANCELLED | OUTPATIENT
Start: 2023-02-06

## 2023-02-06 ENCOUNTER — HOSPITAL ENCOUNTER (OUTPATIENT)
Dept: INFUSION THERAPY | Age: 73
Discharge: HOME | End: 2023-02-06
Payer: COMMERCIAL

## 2023-02-06 ENCOUNTER — OFFICE VISIT (OUTPATIENT)
Dept: ONCOLOGY | Age: 73
End: 2023-02-06
Payer: COMMERCIAL

## 2023-02-06 VITALS
BODY MASS INDEX: 26.55 KG/M2 | SYSTOLIC BLOOD PRESSURE: 130 MMHG | TEMPERATURE: 97.4 F | OXYGEN SATURATION: 97 % | DIASTOLIC BLOOD PRESSURE: 82 MMHG | HEIGHT: 72 IN | HEART RATE: 84 BPM | WEIGHT: 196 LBS

## 2023-02-06 VITALS
BODY MASS INDEX: 26.55 KG/M2 | WEIGHT: 196 LBS | RESPIRATION RATE: 16 BRPM | OXYGEN SATURATION: 97 % | TEMPERATURE: 97.4 F | DIASTOLIC BLOOD PRESSURE: 82 MMHG | HEART RATE: 84 BPM | SYSTOLIC BLOOD PRESSURE: 130 MMHG | HEIGHT: 72 IN

## 2023-02-06 DIAGNOSIS — C79.51 PROSTATE CANCER METASTATIC TO BONE (HCC): Primary | ICD-10-CM

## 2023-02-06 DIAGNOSIS — C61 PROSTATE CANCER METASTATIC TO BONE (HCC): Primary | ICD-10-CM

## 2023-02-06 LAB
ALBUMIN SERPL-MCNC: 4 GM/DL (ref 3.4–5)
ALP BLD-CCNC: 147 IU/L (ref 40–128)
ALT SERPL-CCNC: 14 U/L (ref 10–40)
ANION GAP SERPL CALCULATED.3IONS-SCNC: 16 MMOL/L (ref 4–16)
AST SERPL-CCNC: 16 IU/L (ref 15–37)
BASOPHILS ABSOLUTE: 0 K/CU MM
BASOPHILS RELATIVE PERCENT: 0.1 % (ref 0–1)
BILIRUB SERPL-MCNC: 0.4 MG/DL (ref 0–1)
BUN SERPL-MCNC: 22 MG/DL (ref 6–23)
CALCIUM SERPL-MCNC: 9.5 MG/DL (ref 8.3–10.6)
CHLORIDE BLD-SCNC: 100 MMOL/L (ref 99–110)
CO2: 25 MMOL/L (ref 21–32)
CREAT SERPL-MCNC: 1.2 MG/DL (ref 0.9–1.3)
DIFFERENTIAL TYPE: ABNORMAL
EOSINOPHILS ABSOLUTE: 0 K/CU MM
EOSINOPHILS RELATIVE PERCENT: 0 % (ref 0–3)
GFR SERPL CREATININE-BSD FRML MDRD: >60 ML/MIN/1.73M2
GLUCOSE SERPL-MCNC: 110 MG/DL (ref 70–99)
HCT VFR BLD CALC: 42.1 % (ref 42–52)
HEMOGLOBIN: 13.6 GM/DL (ref 13.5–18)
LYMPHOCYTES ABSOLUTE: 1.4 K/CU MM
LYMPHOCYTES RELATIVE PERCENT: 8.1 % (ref 24–44)
MCH RBC QN AUTO: 27.3 PG (ref 27–31)
MCHC RBC AUTO-ENTMCNC: 32.3 % (ref 32–36)
MCV RBC AUTO: 84.4 FL (ref 78–100)
MONOCYTES ABSOLUTE: 1 K/CU MM
MONOCYTES RELATIVE PERCENT: 5.7 % (ref 0–4)
PDW BLD-RTO: 17.7 % (ref 11.7–14.9)
PLATELET # BLD: 261 K/CU MM (ref 140–440)
PMV BLD AUTO: 10.5 FL (ref 7.5–11.1)
POTASSIUM SERPL-SCNC: 4.3 MMOL/L (ref 3.5–5.1)
PROSTATE SPECIFIC ANTIGEN: 21.63 NG/ML (ref 0–4)
RBC # BLD: 4.99 M/CU MM (ref 4.6–6.2)
SEGMENTED NEUTROPHILS ABSOLUTE COUNT: 14.7 K/CU MM
SEGMENTED NEUTROPHILS RELATIVE PERCENT: 86.1 % (ref 36–66)
SODIUM BLD-SCNC: 141 MMOL/L (ref 135–145)
TOTAL PROTEIN: 6.2 GM/DL (ref 6.4–8.2)
WBC # BLD: 17.1 K/CU MM (ref 4–10.5)

## 2023-02-06 PROCEDURE — 96413 CHEMO IV INFUSION 1 HR: CPT

## 2023-02-06 PROCEDURE — 6360000002 HC RX W HCPCS: Performed by: NURSE PRACTITIONER

## 2023-02-06 PROCEDURE — 80053 COMPREHEN METABOLIC PANEL: CPT

## 2023-02-06 PROCEDURE — 99214 OFFICE O/P EST MOD 30 MIN: CPT | Performed by: INTERNAL MEDICINE

## 2023-02-06 PROCEDURE — 96367 TX/PROPH/DG ADDL SEQ IV INF: CPT

## 2023-02-06 PROCEDURE — 96375 TX/PRO/DX INJ NEW DRUG ADDON: CPT

## 2023-02-06 PROCEDURE — 2580000003 HC RX 258: Performed by: NURSE PRACTITIONER

## 2023-02-06 PROCEDURE — 1123F ACP DISCUSS/DSCN MKR DOCD: CPT | Performed by: INTERNAL MEDICINE

## 2023-02-06 PROCEDURE — 96415 CHEMO IV INFUSION ADDL HR: CPT

## 2023-02-06 PROCEDURE — 2500000003 HC RX 250 WO HCPCS: Performed by: NURSE PRACTITIONER

## 2023-02-06 PROCEDURE — 85025 COMPLETE CBC W/AUTO DIFF WBC: CPT

## 2023-02-06 PROCEDURE — G0103 PSA SCREENING: HCPCS

## 2023-02-06 RX ORDER — FAMOTIDINE 10 MG/ML
20 INJECTION, SOLUTION INTRAVENOUS ONCE
Status: COMPLETED | OUTPATIENT
Start: 2023-02-06 | End: 2023-02-06

## 2023-02-06 RX ORDER — DIPHENHYDRAMINE HYDROCHLORIDE 50 MG/ML
50 INJECTION INTRAMUSCULAR; INTRAVENOUS ONCE
Status: COMPLETED | OUTPATIENT
Start: 2023-02-06 | End: 2023-02-06

## 2023-02-06 RX ORDER — SODIUM CHLORIDE 9 MG/ML
5-250 INJECTION, SOLUTION INTRAVENOUS PRN
Status: DISCONTINUED | OUTPATIENT
Start: 2023-02-06 | End: 2023-02-07 | Stop reason: HOSPADM

## 2023-02-06 RX ORDER — SODIUM CHLORIDE 0.9 % (FLUSH) 0.9 %
5-40 SYRINGE (ML) INJECTION PRN
Status: DISCONTINUED | OUTPATIENT
Start: 2023-02-06 | End: 2023-02-07 | Stop reason: HOSPADM

## 2023-02-06 RX ADMIN — DOCETAXEL 149 MG: 20 INJECTION, SOLUTION, CONCENTRATE INTRAVENOUS at 11:16

## 2023-02-06 RX ADMIN — FAMOTIDINE 20 MG: 10 INJECTION, SOLUTION INTRAVENOUS at 10:49

## 2023-02-06 RX ADMIN — DIPHENHYDRAMINE HYDROCHLORIDE 50 MG: 50 INJECTION INTRAMUSCULAR; INTRAVENOUS at 10:48

## 2023-02-06 RX ADMIN — DEXAMETHASONE SODIUM PHOSPHATE 20 MG: 10 INJECTION INTRAMUSCULAR; INTRAVENOUS at 10:49

## 2023-02-06 RX ADMIN — SODIUM CHLORIDE 20 ML/HR: 9 INJECTION, SOLUTION INTRAVENOUS at 10:48

## 2023-02-06 ASSESSMENT — PAIN SCALES - GENERAL: PAINLEVEL_OUTOF10: 4

## 2023-02-06 ASSESSMENT — PAIN DESCRIPTION - LOCATION: LOCATION: GENERALIZED

## 2023-02-06 NOTE — PROGRESS NOTES
MA Rooming Questions  Patient: Ricarda Caraballo  MRN: [id number]    Date: 2/6/2023        1. Do you have any new issues? yes - pain down both legs. Slight headaches         2. Do you need any refills on medications?    no    3. Have you had any imaging done since your last visit?   no    4. Have you been hospitalized or seen in the emergency room since your last visit here?   no    5. Did the patient have a depression screening completed today?  No    No data recorded     PHQ-9 Given to (if applicable):               PHQ-9 Score (if applicable):                     [] Positive     []  Negative              Does question #9 need addressed (if applicable)                     [] Yes    []  No               Zohaib Caicedo CMA

## 2023-02-06 NOTE — PROGRESS NOTES
Patient arrived to treatment suite for blood draw, pre-medications, and chemotherapy infusion. PIV started in left arm and blood drawn from site and sent to lab for processing. Patient has no questions or concerns for the doctor at this time. Treatment approved and given. Taxotere started at 25ml/hr. Increased every 30 minutes to 50ml, 75ml, 130ml and 172ml as max rate due to patient reactions during previous treatments. Patient tolerated well. Left treatment suite ambulatory. Discharge instructions provided. Patient's status assessed and documented appropriately. All labs and required results were also reviewed today. Treatment parameters have been reviewed. Today's treatment has been approved by the provider. Treatment orders and medication sequencing (when applicable) was verified by 2 registered nurses. The treatment plan was confirmed with the patient prior to administration, and the patient understands the need to report any treatment-related symptoms. Prior to administration, when applicable, the following 8 elements of medication administration were reviewed with 2nd Registered Nurse prior to dosing: drug name, drug dose, infusion volume when prepared in a syringe, rate of administration, expiration dates and/or times, appearance and integrity of drug(s), and rate of pump for infusion. The 5 rights of medication administration have been verified.

## 2023-02-23 DIAGNOSIS — C61 PROSTATE CANCER METASTATIC TO BONE (HCC): Primary | ICD-10-CM

## 2023-02-23 DIAGNOSIS — C79.51 PROSTATE CANCER METASTATIC TO BONE (HCC): Primary | ICD-10-CM

## 2023-02-25 RX ORDER — FAMOTIDINE 10 MG/ML
20 INJECTION, SOLUTION INTRAVENOUS
Status: CANCELLED | OUTPATIENT
Start: 2023-02-27

## 2023-02-25 RX ORDER — SODIUM CHLORIDE 9 MG/ML
INJECTION, SOLUTION INTRAVENOUS CONTINUOUS
Status: CANCELLED | OUTPATIENT
Start: 2023-02-27

## 2023-02-25 RX ORDER — ONDANSETRON 2 MG/ML
8 INJECTION INTRAMUSCULAR; INTRAVENOUS
Status: CANCELLED | OUTPATIENT
Start: 2023-02-27

## 2023-02-25 RX ORDER — HEPARIN SODIUM (PORCINE) LOCK FLUSH IV SOLN 100 UNIT/ML 100 UNIT/ML
500 SOLUTION INTRAVENOUS PRN
Status: CANCELLED | OUTPATIENT
Start: 2023-02-27

## 2023-02-25 RX ORDER — ACETAMINOPHEN 325 MG/1
650 TABLET ORAL
Status: CANCELLED | OUTPATIENT
Start: 2023-02-27

## 2023-02-25 RX ORDER — ALBUTEROL SULFATE 90 UG/1
4 AEROSOL, METERED RESPIRATORY (INHALATION) PRN
Status: CANCELLED | OUTPATIENT
Start: 2023-02-27

## 2023-02-25 RX ORDER — DIPHENHYDRAMINE HYDROCHLORIDE 50 MG/ML
50 INJECTION INTRAMUSCULAR; INTRAVENOUS
Status: CANCELLED | OUTPATIENT
Start: 2023-02-27

## 2023-02-25 RX ORDER — SODIUM CHLORIDE 9 MG/ML
5-250 INJECTION, SOLUTION INTRAVENOUS PRN
Status: CANCELLED | OUTPATIENT
Start: 2023-02-27

## 2023-02-25 RX ORDER — EPINEPHRINE 1 MG/ML
0.3 INJECTION, SOLUTION, CONCENTRATE INTRAVENOUS PRN
Status: CANCELLED | OUTPATIENT
Start: 2023-02-27

## 2023-02-25 RX ORDER — MEPERIDINE HYDROCHLORIDE 25 MG/ML
12.5 INJECTION INTRAMUSCULAR; INTRAVENOUS; SUBCUTANEOUS PRN
Status: CANCELLED | OUTPATIENT
Start: 2023-02-27

## 2023-02-25 RX ORDER — SODIUM CHLORIDE 9 MG/ML
5-40 INJECTION INTRAVENOUS PRN
Status: CANCELLED | OUTPATIENT
Start: 2023-02-27

## 2023-02-25 NOTE — PROGRESS NOTES
Patient Name:  Lorraine Munoz  Patient :  1950  Patient MRN:  [id number]     Primary Oncologist: Violeta Boone MD  Referring Provider: Ana Paula Mosqueda MD     Date of Service: 2023     Chief Complaint:    Chief Complaint   Patient presents with    Follow-up    Treatment     Patient's active problem list:        Metastatic prostate cancer    HPI:   Lorraine Munoz is a 82-year-old very pleasant gentleman with medical history significant for hypertension, hyperlipidemia, COPD, GERD, osteoarthritis and history of kidney stone, initially referred to me on 2022 for evaluation of metastatic prostate cancer. He initially presented to [facility] on 22 with abdominal pain and PVR was about 9 liter. CT scan on 22 showed cystitis, b/l moderate to severe HN, b/l renal stones. All the renal stones are non obstructive and his ARF resolved with koch catheter. He was also noted to have significant elevation in PSA (331 ng/ml on 22). It was 150 on 2022 and 5 on 2019. RACHEL at that time showed rock hard large prostate. MRI pelvis showed diffuse tumor involvement of the prostate gland, with loss of zonal anatomy, extraprostatic extension and involvement of the seminal vesicles and suspected involvement of the neurovascular bundles, PI-RADS 5. Enlarged pelvic and retroperitoneal lymph nodes consistent with willow metastasis. A larger right common iliac chain lymph node results in mass-effect on the right common iliac vein. Early mucosa enhancement of the urinary bladder. Differential includes a cystitis although involvement of the urinary bladder is not excluded given extensive involvement of the prostate gland. He has right lower extremity swelling. Doppler study was negative for DVT and Echo showed normal EF. RLE swelling is most likely due to mass effect from enlarged lymph node to right common iliac vein.      Bone scan done on 2022 showed findings concerning for metastatic disease within the T10 vertebral body. Findings consistent with degenerative changes in the spine and multiple joints and possibly an old fracture in the left rib cage. He is scheduled for TURP to get tissue diagnosis and to release urine outflow obstruction on 10/13/22. Dr. Garret Ayala started him on proscar and casodex on 8/19/22. He underwent transurethral resection of prostate cancer on 10/13/22 and pathology showed prostate adenocarcinoma, fabi score 4+4=8, (grade group 4, 12/15 cores, involving approximately 30% of tissue present. Dr. Garret Ayala started ADT with eligard since early November 2022. Chemotherapy with docetaxel and abiraterone were started on 12/5/22. On February 27, 2023, he presented to me for follow-up. I have been following him for prostate cancer. Clinically, he has castration sensitive prostate cancer, grade group 4 and I reviewed with him all the treatment options. I recommend ADT with docetaxel and abiraterone. Reviewed with him all the potential side effects as well as benefits of the therapy. After long discussion, he is in agreement with the plans. I discussed his case with Dr. Garret Ayala and Lakeshia Grubbs was started early November 2022. Docetaxel and abiraterone were started on 12/5/2022. He is s/p fourth cycle of docetaxel and he is tolerating it well. He has mild reaction on docetaxel with every cycle but able to finish it. Will give his fifth cycle today. I will continue to follow him closely during chemotherapy. He stated that he is feeling some improvement after TURP and ADT. Chemo induced nausea - recommend him to take zofran as needed basis. Hypertension - his SBP is mildly elevated. Recommend to continue with metoprolol and amlodipnie. Bronchial asthma - he is on albuterol INH and wixela inbub. He doesn't have any new complaints on today visit. Past Medical History:     Significant for  1. Hypertension  2. Hyperlipidemia  3. COPD  4. GERD  5. Osteoarthritis  6. History of kidney stone    Past Surgery History:    Significant for  1. Laparoscopic cholecystectomy  2. Right leg surgery  3. Tonsillectomy                                                                          4.  Endoscopy with dilatation    Social History:   He is a former smoker and he quit smoking in 7/10/2015. He used to smoke 2 packs a day for approximately 45 years. He denies alcohol drinking or illicit drug abuse. Family History:    Significant for COPD and CHF in his mother. No Known Allergies    Review of Systems: \"Per interval history; otherwise 10 point ROS is negative. \"  His energy level is okay today and his sleep is fine. He doesn't have fever, chills, night sweats, cough, shortness of breath, chest pain, hemoptysis or palpitations. His bowel and bladder functions are normal. He denies nausea, vomiting, abdominal pain, diarrhea, constipation, dysuria, loss of appetite or weight loss. He denies neuropathy and he doesn't have bleeding or clotting issues. He denies any pain in his body. No anxiety or depression. The rest of the systems are unremarkable.      Vital Signs: BP (!) 146/82 (Site: Right Upper Arm, Position: Sitting, Cuff Size: Medium Adult)   Pulse 89   Temp 98.1 °F (36.7 °C) (Temporal)   Ht 6' (1.829 m)   Wt 207 lb (93.9 kg)   BMI 28.07 kg/m²      Physical Exam:  CONSTITUTIONAL: awake, alert, cooperative, no apparent distress   EYES: pupils equal, round and reactive to light, sclera clear, normal conjunctiva  ENT: Normocephalic, without obvious abnormality, atraumatic  NECK: supple, symmetrical, no jugular venous distension, no carotid bruits   HEMATOLOGIC/LYMPHATIC: no cervical, supraclavicular or axillary lymphadenopathy   LUNGS: VBS, no wheezes, clear to auscultation, no crackles, no increased work of breathing, no rhonchi,    CARDIOVASCULAR: regular rate and rhythm, normal S1 and S2, no murmur noted  ABDOMEN: normal bowel sounds x 4, soft, non-distended, non-tender, no masses palpated, no hepatosplenomegaly   MUSCULOSKELETAL: full range of motion noted, tone is normal  NEUROLOGIC: awake, alert, oriented to name, place and time. Motor skills grossly intact. SKIN: appears intact, normal skin color, normal texture, normal turgor, no jaundice.    EXTREMITIES: RLE swelling +, no clubbing, no cyanosis,        Labs:  Hematology:  Lab Results   Component Value Date    WBC 12.0 (H) 02/27/2023    RBC 4.65 02/27/2023    HGB 12.8 (L) 02/27/2023    HCT 40.6 (L) 02/27/2023    MCV 87.3 02/27/2023    MCH 27.5 02/27/2023    MCHC 31.5 (L) 02/27/2023    RDW 17.9 (H) 02/27/2023     02/27/2023    MPV 10.7 02/27/2023    SEGSPCT 66.4 (H) 02/27/2023    EOSRELPCT 0.1 02/27/2023    BASOPCT 0.4 02/27/2023    LYMPHOPCT 22.7 (L) 02/27/2023    MONOPCT 10.4 (H) 02/27/2023    SEGSABS 8.0 02/27/2023    EOSABS 0.0 02/27/2023    BASOSABS 0.1 02/27/2023    LYMPHSABS 2.7 02/27/2023    MONOSABS 1.3 02/27/2023    DIFFTYPE AUTOMATED DIFFERENTIAL 02/27/2023     No results found for: ESR  Chemistry:  Lab Results   Component Value Date     02/27/2023    K 3.7 02/27/2023     02/27/2023    CO2 28 02/27/2023    BUN 17 02/27/2023    CREATININE 1.0 02/27/2023    GLUCOSE 77 02/27/2023    CALCIUM 9.1 02/27/2023    PROT 6.1 (L) 02/27/2023    LABALBU 3.8 02/27/2023    BILITOT 0.4 02/27/2023    ALKPHOS 151 (H) 02/27/2023    AST 22 02/27/2023    ALT 15 02/27/2023    LABGLOM >60 02/27/2023     No results found for: MMA, LDH, HOMOCYSTEINE  No components found for: LD  No results found for: TSHHS, T4FREE, FT3  Immunology:  Lab Results   Component Value Date    PROT 6.1 (L) 02/27/2023     No results found for: Aldean Bulls, KLFLCR  No results found for: B2M  Coagulation Panel:  No results found for: PROTIME, INR, APTT, DDIMER  Anemia Panel:  No results found for: RYTAJMBX63, FOLATE  Tumor Markers:  Lab Results   Component Value Date    PSA 21.63 (H) 02/06/2023 Observations:  PHQ-9 Total Score: 0 (2/27/2023 11:11 AM)     Assessment   Castration naive metastatic prostate cancer    Plan:  Tommy Brady is a 70-year-old very pleasant gentleman who initially presented to [facility] with urinary outlet obstruction. Further work up showed that he has rock hard large prostate on RACHEL with significantly elevated PSA (331). MRI showed multiple abdominal and pelvic lymphadenopathy. One of the right pelvic lymph node has mass effect of right common iliac vein and causing him significant right leg swelling. Bone scan showed metastatic disease in T10 vertebra body. He is scheduled for TURP to get tissue diagnosis and to release urine outflow obstruction on 10/13/22. Dr. Demetra Arriaga started him on proscar and casodex on 8/19/22. He underwent transurethral resection of prostate cancer on 10/13/22 and pathology showed prostate adenocarcinoma, fabi score 4+4=8, (grade group 4, 12/15 cores, involving approximately 30% of tissue present. Dr. Demetra Arriaga started ADT with eligard since early November 2022. Chemotherapy with docetaxel and abiraterone were started on 12/5/22. On February 6, 2023, he presented to me for follow-up. I have been On February 27, 2023, he presented to me for follow-up. I have been following him for prostate cancer. Clinically, he has castration sensitive prostate cancer, grade group 4 and I reviewed with him all the treatment options. I recommend ADT with docetaxel and abiraterone. Reviewed with him all the potential side effects as well as benefits of the therapy. After long discussion, he is in agreement with the plans. I discussed his case with Dr. Demetra Arriaga and Nany Christa was started early November 2022. Docetaxel and abiraterone were started on 12/5/2022. He is s/p fourth cycle of docetaxel and he is tolerating it well. He has mild reaction on docetaxel with every cycle but able to finish it. Will give his fifth cycle today.      I will continue to follow him closely during chemotherapy. He stated that he is feeling some improvement after TURP and ADT. Chemo induced nausea - recommend him to take zofran as needed basis. Hypertension - his SBP is mildly elevated. Recommend to continue with metoprolol and amlodipnie. Bronchial asthma - he is on albuterol INH and wixela inbub. I answered all his questions and concerns for today. Recent imaging and labs were reviewed and discussed with the patient.

## 2023-02-27 ENCOUNTER — HOSPITAL ENCOUNTER (OUTPATIENT)
Dept: INFUSION THERAPY | Age: 73
Discharge: HOME | End: 2023-02-27
Payer: COMMERCIAL

## 2023-02-27 ENCOUNTER — OFFICE VISIT (OUTPATIENT)
Dept: ONCOLOGY | Age: 73
End: 2023-02-27
Payer: COMMERCIAL

## 2023-02-27 VITALS
HEART RATE: 82 BPM | WEIGHT: 207 LBS | RESPIRATION RATE: 18 BRPM | TEMPERATURE: 98.1 F | HEIGHT: 72 IN | BODY MASS INDEX: 28.04 KG/M2 | DIASTOLIC BLOOD PRESSURE: 82 MMHG | SYSTOLIC BLOOD PRESSURE: 146 MMHG

## 2023-02-27 VITALS
WEIGHT: 207 LBS | DIASTOLIC BLOOD PRESSURE: 82 MMHG | SYSTOLIC BLOOD PRESSURE: 146 MMHG | HEART RATE: 89 BPM | HEIGHT: 72 IN | TEMPERATURE: 98.1 F | BODY MASS INDEX: 28.04 KG/M2

## 2023-02-27 DIAGNOSIS — C61 PROSTATE CANCER METASTATIC TO BONE (HCC): Primary | ICD-10-CM

## 2023-02-27 DIAGNOSIS — C79.51 PROSTATE CANCER METASTATIC TO BONE (HCC): Primary | ICD-10-CM

## 2023-02-27 LAB
ALBUMIN SERPL-MCNC: 3.8 GM/DL (ref 3.4–5)
ALP BLD-CCNC: 151 IU/L (ref 40–128)
ALT SERPL-CCNC: 15 U/L (ref 10–40)
ANION GAP SERPL CALCULATED.3IONS-SCNC: 13 MMOL/L (ref 4–16)
AST SERPL-CCNC: 22 IU/L (ref 15–37)
BASOPHILS ABSOLUTE: 0.1 K/CU MM
BASOPHILS RELATIVE PERCENT: 0.4 % (ref 0–1)
BILIRUB SERPL-MCNC: 0.4 MG/DL (ref 0–1)
BUN SERPL-MCNC: 17 MG/DL (ref 6–23)
CALCIUM SERPL-MCNC: 9.1 MG/DL (ref 8.3–10.6)
CHLORIDE BLD-SCNC: 103 MMOL/L (ref 99–110)
CO2: 28 MMOL/L (ref 21–32)
CREAT SERPL-MCNC: 1 MG/DL (ref 0.9–1.3)
DIFFERENTIAL TYPE: ABNORMAL
EOSINOPHILS ABSOLUTE: 0 K/CU MM
EOSINOPHILS RELATIVE PERCENT: 0.1 % (ref 0–3)
GFR SERPL CREATININE-BSD FRML MDRD: >60 ML/MIN/1.73M2
GLUCOSE SERPL-MCNC: 77 MG/DL (ref 70–99)
HCT VFR BLD CALC: 40.6 % (ref 42–52)
HEMOGLOBIN: 12.8 GM/DL (ref 13.5–18)
LYMPHOCYTES ABSOLUTE: 2.7 K/CU MM
LYMPHOCYTES RELATIVE PERCENT: 22.7 % (ref 24–44)
MCH RBC QN AUTO: 27.5 PG (ref 27–31)
MCHC RBC AUTO-ENTMCNC: 31.5 % (ref 32–36)
MCV RBC AUTO: 87.3 FL (ref 78–100)
MONOCYTES ABSOLUTE: 1.3 K/CU MM
MONOCYTES RELATIVE PERCENT: 10.4 % (ref 0–4)
PDW BLD-RTO: 17.9 % (ref 11.7–14.9)
PLATELET # BLD: 284 K/CU MM (ref 140–440)
PMV BLD AUTO: 10.7 FL (ref 7.5–11.1)
POTASSIUM SERPL-SCNC: 3.7 MMOL/L (ref 3.5–5.1)
RBC # BLD: 4.65 M/CU MM (ref 4.6–6.2)
SEGMENTED NEUTROPHILS ABSOLUTE COUNT: 8 K/CU MM
SEGMENTED NEUTROPHILS RELATIVE PERCENT: 66.4 % (ref 36–66)
SODIUM BLD-SCNC: 144 MMOL/L (ref 135–145)
TOTAL PROTEIN: 6.1 GM/DL (ref 6.4–8.2)
WBC # BLD: 12 K/CU MM (ref 4–10.5)

## 2023-02-27 PROCEDURE — 1123F ACP DISCUSS/DSCN MKR DOCD: CPT | Performed by: INTERNAL MEDICINE

## 2023-02-27 PROCEDURE — 2500000003 HC RX 250 WO HCPCS: Performed by: NURSE PRACTITIONER

## 2023-02-27 PROCEDURE — 3017F COLORECTAL CA SCREEN DOC REV: CPT | Performed by: INTERNAL MEDICINE

## 2023-02-27 PROCEDURE — 36415 COLL VENOUS BLD VENIPUNCTURE: CPT

## 2023-02-27 PROCEDURE — 2580000003 HC RX 258: Performed by: NURSE PRACTITIONER

## 2023-02-27 PROCEDURE — 99214 OFFICE O/P EST MOD 30 MIN: CPT | Performed by: INTERNAL MEDICINE

## 2023-02-27 PROCEDURE — G8417 CALC BMI ABV UP PARAM F/U: HCPCS | Performed by: INTERNAL MEDICINE

## 2023-02-27 PROCEDURE — 6360000002 HC RX W HCPCS: Performed by: NURSE PRACTITIONER

## 2023-02-27 PROCEDURE — 96375 TX/PRO/DX INJ NEW DRUG ADDON: CPT

## 2023-02-27 PROCEDURE — 80053 COMPREHEN METABOLIC PANEL: CPT

## 2023-02-27 PROCEDURE — 1036F TOBACCO NON-USER: CPT | Performed by: INTERNAL MEDICINE

## 2023-02-27 PROCEDURE — G8427 DOCREV CUR MEDS BY ELIG CLIN: HCPCS | Performed by: INTERNAL MEDICINE

## 2023-02-27 PROCEDURE — 85025 COMPLETE CBC W/AUTO DIFF WBC: CPT

## 2023-02-27 PROCEDURE — 96413 CHEMO IV INFUSION 1 HR: CPT

## 2023-02-27 PROCEDURE — G8484 FLU IMMUNIZE NO ADMIN: HCPCS | Performed by: INTERNAL MEDICINE

## 2023-02-27 PROCEDURE — 96367 TX/PROPH/DG ADDL SEQ IV INF: CPT

## 2023-02-27 RX ORDER — FAMOTIDINE 10 MG/ML
20 INJECTION, SOLUTION INTRAVENOUS ONCE
Status: COMPLETED | OUTPATIENT
Start: 2023-02-27 | End: 2023-02-27

## 2023-02-27 RX ORDER — SODIUM CHLORIDE 9 MG/ML
5-250 INJECTION, SOLUTION INTRAVENOUS PRN
Status: DISCONTINUED | OUTPATIENT
Start: 2023-02-27 | End: 2023-02-28 | Stop reason: HOSPADM

## 2023-02-27 RX ORDER — AMLODIPINE BESYLATE 5 MG/1
TABLET ORAL
COMMUNITY
Start: 2023-02-24

## 2023-02-27 RX ORDER — SODIUM CHLORIDE 0.9 % (FLUSH) 0.9 %
5-40 SYRINGE (ML) INJECTION PRN
Status: DISCONTINUED | OUTPATIENT
Start: 2023-02-27 | End: 2023-02-28 | Stop reason: HOSPADM

## 2023-02-27 RX ORDER — DEXAMETHASONE 4 MG/1
TABLET ORAL
Qty: 12 TABLET | Refills: 5 | Status: SHIPPED | OUTPATIENT
Start: 2023-02-27

## 2023-02-27 RX ORDER — BICALUTAMIDE 50 MG/1
TABLET, FILM COATED ORAL
COMMUNITY
Start: 2023-02-14

## 2023-02-27 RX ORDER — DIPHENHYDRAMINE HYDROCHLORIDE 50 MG/ML
50 INJECTION INTRAMUSCULAR; INTRAVENOUS ONCE
Status: COMPLETED | OUTPATIENT
Start: 2023-02-27 | End: 2023-02-27

## 2023-02-27 RX ADMIN — DEXAMETHASONE SODIUM PHOSPHATE 20 MG: 10 INJECTION INTRAMUSCULAR; INTRAVENOUS at 09:46

## 2023-02-27 RX ADMIN — DIPHENHYDRAMINE HYDROCHLORIDE 50 MG: 50 INJECTION INTRAMUSCULAR; INTRAVENOUS at 09:40

## 2023-02-27 RX ADMIN — FAMOTIDINE 20 MG: 10 INJECTION, SOLUTION INTRAVENOUS at 09:40

## 2023-02-27 RX ADMIN — DOCETAXEL 149 MG: 20 INJECTION, SOLUTION INTRAVENOUS at 10:34

## 2023-02-27 RX ADMIN — SODIUM CHLORIDE 20 ML/HR: 9 INJECTION, SOLUTION INTRAVENOUS at 09:40

## 2023-02-27 RX ADMIN — SODIUM CHLORIDE, PRESERVATIVE FREE 10 ML: 5 INJECTION INTRAVENOUS at 12:41

## 2023-02-27 ASSESSMENT — PATIENT HEALTH QUESTIONNAIRE - PHQ9
2. FEELING DOWN, DEPRESSED OR HOPELESS: 0
SUM OF ALL RESPONSES TO PHQ QUESTIONS 1-9: 0
SUM OF ALL RESPONSES TO PHQ9 QUESTIONS 1 & 2: 0
SUM OF ALL RESPONSES TO PHQ QUESTIONS 1-9: 0
1. LITTLE INTEREST OR PLEASURE IN DOING THINGS: 0

## 2023-02-27 NOTE — PROGRESS NOTES
Pt. Here for treatment with friend. Peripheral IV started without difficulty, good blood return noted. Lab work drawn as ordered. Labs reviewed and treatment administered without incident. Pre-medications given. Treatment administered without incident. Discharge AVS given. Patient's status assessed and documented appropriately. All labs and required results were also reviewed today. Treatment parameters have been reviewed. Today's treatment has been approved by the provider. Treatment orders and medication sequencing (when applicable) was verified by 2 registered nurses. The treatment plan was confirmed with the patient prior to administration, and the patient understands the need to report any treatment-related symptoms. Prior to administration, when applicable, the following 8 elements of medication administration were reviewed with 2nd Registered Nurse prior to dosing: drug name, drug dose, infusion volume when prepared in a syringe, rate of administration, expiration dates and/or times, appearance and integrity of drug(s), and rate of pump for infusion. The 5 rights of medication administration have been verified.

## 2023-02-27 NOTE — PROGRESS NOTES
MA Rooming Questions  Patient: Stoney Morejon  MRN: [id number]    Date: 2/27/2023        1. Do you have any new issues?   no         2. Do you need any refills on medications?    yes - dex    3. Have you had any imaging done since your last visit?   no    4. Have you been hospitalized or seen in the emergency room since your last visit here?   no    5. Did the patient have a depression screening completed today? Yes    PHQ-9 Total Score: 0 (2/27/2023 11:11 AM)       PHQ-9 Given to (if applicable):               PHQ-9 Score (if applicable):                     [] Positive     []  Negative              Does question #9 need addressed (if applicable)                     [] Yes    []  No               Leah Griggs CMA

## 2023-03-15 DIAGNOSIS — C61 PROSTATE CANCER METASTATIC TO BONE (HCC): Primary | ICD-10-CM

## 2023-03-15 DIAGNOSIS — C79.51 PROSTATE CANCER METASTATIC TO BONE (HCC): Primary | ICD-10-CM

## 2023-03-19 RX ORDER — ACETAMINOPHEN 325 MG/1
650 TABLET ORAL
Status: CANCELLED | OUTPATIENT
Start: 2023-03-20

## 2023-03-19 RX ORDER — SODIUM CHLORIDE 9 MG/ML
5-40 INJECTION INTRAVENOUS PRN
Status: CANCELLED | OUTPATIENT
Start: 2023-03-20

## 2023-03-19 RX ORDER — SODIUM CHLORIDE 9 MG/ML
5-250 INJECTION, SOLUTION INTRAVENOUS PRN
Status: CANCELLED | OUTPATIENT
Start: 2023-03-20

## 2023-03-19 RX ORDER — DIPHENHYDRAMINE HYDROCHLORIDE 50 MG/ML
50 INJECTION INTRAMUSCULAR; INTRAVENOUS
Status: CANCELLED | OUTPATIENT
Start: 2023-03-20

## 2023-03-19 RX ORDER — ALBUTEROL SULFATE 90 UG/1
4 AEROSOL, METERED RESPIRATORY (INHALATION) PRN
Status: CANCELLED | OUTPATIENT
Start: 2023-03-20

## 2023-03-19 RX ORDER — SODIUM CHLORIDE 9 MG/ML
INJECTION, SOLUTION INTRAVENOUS CONTINUOUS
Status: CANCELLED | OUTPATIENT
Start: 2023-03-20

## 2023-03-19 RX ORDER — HEPARIN SODIUM (PORCINE) LOCK FLUSH IV SOLN 100 UNIT/ML 100 UNIT/ML
500 SOLUTION INTRAVENOUS PRN
Status: CANCELLED | OUTPATIENT
Start: 2023-03-20

## 2023-03-19 RX ORDER — EPINEPHRINE 1 MG/ML
0.3 INJECTION, SOLUTION, CONCENTRATE INTRAVENOUS PRN
Status: CANCELLED | OUTPATIENT
Start: 2023-03-20

## 2023-03-19 RX ORDER — MEPERIDINE HYDROCHLORIDE 25 MG/ML
12.5 INJECTION INTRAMUSCULAR; INTRAVENOUS; SUBCUTANEOUS PRN
Status: CANCELLED | OUTPATIENT
Start: 2023-03-20

## 2023-03-19 RX ORDER — SODIUM CHLORIDE 0.9 % (FLUSH) 0.9 %
5-40 SYRINGE (ML) INJECTION PRN
Status: CANCELLED | OUTPATIENT
Start: 2023-03-20

## 2023-03-19 RX ORDER — ONDANSETRON 2 MG/ML
8 INJECTION INTRAMUSCULAR; INTRAVENOUS
Status: CANCELLED | OUTPATIENT
Start: 2023-03-20

## 2023-03-19 RX ORDER — FAMOTIDINE 10 MG/ML
20 INJECTION, SOLUTION INTRAVENOUS
Status: CANCELLED | OUTPATIENT
Start: 2023-03-20

## 2023-03-20 ENCOUNTER — HOSPITAL ENCOUNTER (OUTPATIENT)
Dept: INFUSION THERAPY | Age: 73
Discharge: HOME | End: 2023-03-20
Payer: COMMERCIAL

## 2023-03-20 ENCOUNTER — OFFICE VISIT (OUTPATIENT)
Dept: ONCOLOGY | Age: 73
End: 2023-03-20
Payer: COMMERCIAL

## 2023-03-20 VITALS
SYSTOLIC BLOOD PRESSURE: 154 MMHG | HEART RATE: 99 BPM | TEMPERATURE: 98 F | BODY MASS INDEX: 27.63 KG/M2 | DIASTOLIC BLOOD PRESSURE: 88 MMHG | WEIGHT: 204 LBS | OXYGEN SATURATION: 91 % | HEIGHT: 72 IN

## 2023-03-20 VITALS
OXYGEN SATURATION: 91 % | DIASTOLIC BLOOD PRESSURE: 88 MMHG | SYSTOLIC BLOOD PRESSURE: 154 MMHG | BODY MASS INDEX: 27.66 KG/M2 | HEART RATE: 99 BPM | HEIGHT: 72 IN | TEMPERATURE: 98.2 F | WEIGHT: 204.2 LBS

## 2023-03-20 DIAGNOSIS — C79.51 PROSTATE CANCER METASTATIC TO BONE (HCC): Primary | ICD-10-CM

## 2023-03-20 DIAGNOSIS — C61 PROSTATE CANCER METASTATIC TO BONE (HCC): Primary | ICD-10-CM

## 2023-03-20 LAB
ALBUMIN SERPL-MCNC: 3.5 GM/DL (ref 3.4–5)
ALP BLD-CCNC: 150 IU/L (ref 40–128)
ALT SERPL-CCNC: 8 U/L (ref 10–40)
ANION GAP SERPL CALCULATED.3IONS-SCNC: 9 MMOL/L (ref 4–16)
AST SERPL-CCNC: 11 IU/L (ref 15–37)
BASOPHILS ABSOLUTE: 0 K/CU MM
BASOPHILS RELATIVE PERCENT: 0.3 % (ref 0–1)
BILIRUB SERPL-MCNC: 0.3 MG/DL (ref 0–1)
BUN SERPL-MCNC: 20 MG/DL (ref 6–23)
CALCIUM SERPL-MCNC: 9.3 MG/DL (ref 8.3–10.6)
CHLORIDE BLD-SCNC: 102 MMOL/L (ref 99–110)
CO2: 31 MMOL/L (ref 21–32)
CREAT SERPL-MCNC: 1.1 MG/DL (ref 0.9–1.3)
DIFFERENTIAL TYPE: ABNORMAL
EOSINOPHILS ABSOLUTE: 0 K/CU MM
EOSINOPHILS RELATIVE PERCENT: 0 % (ref 0–3)
GFR SERPL CREATININE-BSD FRML MDRD: >60 ML/MIN/1.73M2
GLUCOSE SERPL-MCNC: 96 MG/DL (ref 70–99)
HCT VFR BLD CALC: 35.6 % (ref 42–52)
HEMOGLOBIN: 11.4 GM/DL (ref 13.5–18)
LYMPHOCYTES ABSOLUTE: 1.9 K/CU MM
LYMPHOCYTES RELATIVE PERCENT: 12.4 % (ref 24–44)
MCH RBC QN AUTO: 27.9 PG (ref 27–31)
MCHC RBC AUTO-ENTMCNC: 32 % (ref 32–36)
MCV RBC AUTO: 87 FL (ref 78–100)
MONOCYTES ABSOLUTE: 1.4 K/CU MM
MONOCYTES RELATIVE PERCENT: 9.2 % (ref 0–4)
PDW BLD-RTO: 17 % (ref 11.7–14.9)
PLATELET # BLD: 356 K/CU MM (ref 140–440)
PMV BLD AUTO: 10.1 FL (ref 7.5–11.1)
POTASSIUM SERPL-SCNC: 3.6 MMOL/L (ref 3.5–5.1)
PSA ULTRASENSITIVE: 11.15 NG/ML (ref 0–4)
RBC # BLD: 4.09 M/CU MM (ref 4.6–6.2)
SEGMENTED NEUTROPHILS ABSOLUTE COUNT: 11.9 K/CU MM
SEGMENTED NEUTROPHILS RELATIVE PERCENT: 78.1 % (ref 36–66)
SODIUM BLD-SCNC: 142 MMOL/L (ref 135–145)
TOTAL PROTEIN: 5.7 GM/DL (ref 6.4–8.2)
WBC # BLD: 15.2 K/CU MM (ref 4–10.5)

## 2023-03-20 PROCEDURE — 3017F COLORECTAL CA SCREEN DOC REV: CPT | Performed by: INTERNAL MEDICINE

## 2023-03-20 PROCEDURE — 2500000003 HC RX 250 WO HCPCS: Performed by: NURSE PRACTITIONER

## 2023-03-20 PROCEDURE — 96413 CHEMO IV INFUSION 1 HR: CPT

## 2023-03-20 PROCEDURE — 6360000002 HC RX W HCPCS: Performed by: NURSE PRACTITIONER

## 2023-03-20 PROCEDURE — G8427 DOCREV CUR MEDS BY ELIG CLIN: HCPCS | Performed by: INTERNAL MEDICINE

## 2023-03-20 PROCEDURE — 2580000003 HC RX 258: Performed by: NURSE PRACTITIONER

## 2023-03-20 PROCEDURE — 96375 TX/PRO/DX INJ NEW DRUG ADDON: CPT

## 2023-03-20 PROCEDURE — 80053 COMPREHEN METABOLIC PANEL: CPT

## 2023-03-20 PROCEDURE — G8417 CALC BMI ABV UP PARAM F/U: HCPCS | Performed by: INTERNAL MEDICINE

## 2023-03-20 PROCEDURE — G8484 FLU IMMUNIZE NO ADMIN: HCPCS | Performed by: INTERNAL MEDICINE

## 2023-03-20 PROCEDURE — 99214 OFFICE O/P EST MOD 30 MIN: CPT | Performed by: INTERNAL MEDICINE

## 2023-03-20 PROCEDURE — 1123F ACP DISCUSS/DSCN MKR DOCD: CPT | Performed by: INTERNAL MEDICINE

## 2023-03-20 PROCEDURE — 1036F TOBACCO NON-USER: CPT | Performed by: INTERNAL MEDICINE

## 2023-03-20 PROCEDURE — 85025 COMPLETE CBC W/AUTO DIFF WBC: CPT

## 2023-03-20 PROCEDURE — 84153 ASSAY OF PSA TOTAL: CPT

## 2023-03-20 RX ORDER — DIPHENHYDRAMINE HYDROCHLORIDE 50 MG/ML
50 INJECTION INTRAMUSCULAR; INTRAVENOUS ONCE
Status: COMPLETED | OUTPATIENT
Start: 2023-03-20 | End: 2023-03-20

## 2023-03-20 RX ORDER — SODIUM CHLORIDE 9 MG/ML
5-250 INJECTION, SOLUTION INTRAVENOUS PRN
Status: DISCONTINUED | OUTPATIENT
Start: 2023-03-20 | End: 2023-03-21 | Stop reason: HOSPADM

## 2023-03-20 RX ORDER — FAMOTIDINE 10 MG/ML
20 INJECTION, SOLUTION INTRAVENOUS ONCE
Status: COMPLETED | OUTPATIENT
Start: 2023-03-20 | End: 2023-03-20

## 2023-03-20 RX ADMIN — DEXAMETHASONE SODIUM PHOSPHATE 20 MG: 10 INJECTION INTRAMUSCULAR; INTRAVENOUS at 11:08

## 2023-03-20 RX ADMIN — DIPHENHYDRAMINE HYDROCHLORIDE 50 MG: 50 INJECTION INTRAMUSCULAR; INTRAVENOUS at 11:01

## 2023-03-20 RX ADMIN — DOCETAXEL ANHYDROUS 149 MG: 20 INJECTION, SOLUTION INTRAVENOUS at 11:38

## 2023-03-20 RX ADMIN — FAMOTIDINE 20 MG: 10 INJECTION, SOLUTION INTRAVENOUS at 11:01

## 2023-03-20 RX ADMIN — SODIUM CHLORIDE 20 ML/HR: 9 INJECTION, SOLUTION INTRAVENOUS at 11:07

## 2023-03-20 NOTE — LETTER
March 20, 2023      No referring provider defined for this encounter. Patient: Dia Romano   MR Number: [id number]   YOB: 1950   Date of Visit: 3/20/2023       Dear Dr. Mehta Mail ref. provider found: Thank you for referring Taylor Kovacs to me for evaluation/treatment. Below are the relevant portions of my assessment and plan of care. If you have questions, please do not hesitate to call me. I look forward to following Dayo Solis along with you.     Sincerely,        Sarah Pires MD    CC providers:  MD [address]  [address] Valerio Henry MD  [address] [place] [id number]  Via Fax: [phone]

## 2023-03-20 NOTE — PROGRESS NOTES
Patient arrived to treatment suite for blood draw, pre-medications, and chemotherapy infusion. PIV started in left arm and blood drawn from site and sent to lab for processing. Patient has an office visit this morning as well. Treatment authorized and administered as ordered. Patient tolerated well. Left treatment suite ambulatory. Discharge instructions provided. Patient's status assessed and documented appropriately. All labs and required results were also reviewed today. Treatment parameters have been reviewed. Today's treatment has been approved by the provider. Treatment orders and medication sequencing (when applicable) was verified by 2 registered nurses. The treatment plan was confirmed with the patient prior to administration, and the patient understands the need to report any treatment-related symptoms. Prior to administration, when applicable, the following 8 elements of medication administration were reviewed with 2nd Registered Nurse prior to dosing: drug name, drug dose, infusion volume when prepared in a syringe, rate of administration, expiration dates and/or times, appearance and integrity of drug(s), and rate of pump for infusion. The 5 rights of medication administration have been verified.

## 2023-03-20 NOTE — PROGRESS NOTES
MA Rooming Questions  Patient: Alyse Benavides  MRN: [id number]    Date: 3/20/2023        1. Do you have any new issues?   no         2. Do you need any refills on medications?    no    3. Have you had any imaging done since your last visit?   no    4. Have you been hospitalized or seen in the emergency room since your last visit here?   no    5. Did the patient have a depression screening completed today?  No    No data recorded     PHQ-9 Given to (if applicable):               PHQ-9 Score (if applicable):                     [] Positive     []  Negative              Does question #9 need addressed (if applicable)                     [] Yes    []  No               Gio Serrano CMA
doesn't have any new complaints on today visit. Past Medical History:     Significant for  1. Hypertension  2. Hyperlipidemia  3. COPD  4. GERD  5. Osteoarthritis  6. History of kidney stone    Past Surgery History:    Significant for  1. Laparoscopic cholecystectomy  2. Right leg surgery  3. Tonsillectomy                                                                          4.  Endoscopy with dilatation    Social History:   He is a former smoker and he quit smoking in 7/10/2015. He used to smoke 2 packs a day for approximately 45 years. He denies alcohol drinking or illicit drug abuse. Family History:    Significant for COPD and CHF in his mother. No Known Allergies    Review of Systems: \"Per interval history; otherwise 10 point ROS is negative. \"  His energy level is pretty good today and his sleep is fine. He doesn't have fever, chills, night sweats, cough, shortness of breath, chest pain, hemoptysis or palpitations. His bowel and bladder functions are normal. He denies nausea, vomiting, abdominal pain, diarrhea, constipation, dysuria, loss of appetite or weight loss. He denies neuropathy and he doesn't have bleeding or clotting issues. He denies any pain in his body. No anxiety or depression. The rest of the systems are unremarkable.      Vital Signs: BP (!) 154/88 (Site: Left Upper Arm, Position: Sitting, Cuff Size: Medium Adult)   Pulse 99   Temp 98 °F (36.7 °C) (Infrared)   Ht 6' (1.829 m)   Wt 204 lb (92.5 kg)   SpO2 91%   BMI 27.67 kg/m²      Physical Exam:  CONSTITUTIONAL: awake, alert, cooperative, no apparent distress   EYES: pupils equal, round and reactive to light, sclera clear, normal conjunctiva  ENT: Normocephalic, without obvious abnormality, atraumatic  NECK: supple, symmetrical, no jugular venous distension, no carotid bruits   HEMATOLOGIC/LYMPHATIC: no cervical, supraclavicular or axillary lymphadenopathy   LUNGS: VBS, no wheezes, clear to auscultation, no

## 2023-05-24 DIAGNOSIS — C61 PROSTATE CANCER METASTATIC TO BONE (HCC): Primary | ICD-10-CM

## 2023-05-24 DIAGNOSIS — C79.51 PROSTATE CANCER METASTATIC TO BONE (HCC): Primary | ICD-10-CM

## 2023-05-24 RX ORDER — PREDNISONE 1 MG/1
TABLET ORAL
Qty: 60 TABLET | Refills: 5 | Status: SHIPPED | OUTPATIENT
Start: 2023-05-24

## 2023-05-24 RX ORDER — ABIRATERONE 500 MG/1
1000 TABLET ORAL DAILY
Qty: 60 TABLET | Refills: 5 | Status: ACTIVE | OUTPATIENT
Start: 2023-05-24

## 2023-05-24 NOTE — PROGRESS NOTES
Received VM from [facility] regarding zytiga. Called pharmacy back @ [phone] ext [phone] and spoke with [facility] requesting refill. After completion of therapy in March 2023, physician was recommending patient continue with abiraterone and prednisone only.  2 RX's e-scribed to [facility] with below instructions per physician order:     Abiraterone (Zytiga) 500 mg - Take 2 tablets (1,000 mg) by mouth daily #60    Prednisone 5 mg - Take 5 mg by mouth two times daily #60

## 2023-07-09 NOTE — PROGRESS NOTES
Patient Name:  Bjorn Sandifer  Patient :  1950  Patient MRN:  [id number]     Primary Oncologist: Muna Marc MD  Referring Provider: Roshan Em MD     Date of Service: 2023     Chief Complaint:    Chief Complaint   Patient presents with    Follow-up     Patient's active problem list:        Metastatic prostate cancer    HPI:   Bjorn Sandifer is a 72-year-old very pleasant gentleman with medical history significant for hypertension, hyperlipidemia, COPD, GERD, osteoarthritis and history of kidney stone, initially referred to me on 2022 for evaluation of metastatic prostate cancer. He initially presented to [facility] on 22 with abdominal pain and PVR was about 9 liter. CT scan on 22 showed cystitis, b/l moderate to severe HN, b/l renal stones. All the renal stones are non obstructive and his ARF resolved with koch catheter. He was also noted to have significant elevation in PSA (331 ng/ml on 22). It was 150 on 2022 and 5 on 2019. RACHEL at that time showed rock hard large prostate. MRI pelvis showed diffuse tumor involvement of the prostate gland, with loss of zonal anatomy, extraprostatic extension and involvement of the seminal vesicles and suspected involvement of the neurovascular bundles, PI-RADS 5. Enlarged pelvic and retroperitoneal lymph nodes consistent with willow metastasis. A larger right common iliac chain lymph node results in mass-effect on the right common iliac vein. Early mucosa enhancement of the urinary bladder. Differential includes a cystitis although involvement of the urinary bladder is not excluded given extensive involvement of the prostate gland. He has right lower extremity swelling. Doppler study was negative for DVT and Echo showed normal EF. RLE swelling is most likely due to mass effect from enlarged lymph node to right common iliac vein.      Bone scan done on 2022 showed findings concerning for metastatic disease within the

## 2023-07-20 ENCOUNTER — HOSPITAL ENCOUNTER (OUTPATIENT)
Dept: INFUSION THERAPY | Age: 73
Discharge: HOME | End: 2023-07-20
Payer: COMMERCIAL

## 2023-07-20 DIAGNOSIS — C79.51 PROSTATE CANCER METASTATIC TO BONE (HCC): ICD-10-CM

## 2023-07-20 DIAGNOSIS — C61 PROSTATE CANCER METASTATIC TO BONE (HCC): ICD-10-CM

## 2023-07-20 LAB
ALBUMIN SERPL-MCNC: 3.8 GM/DL (ref 3.4–5)
ALP BLD-CCNC: 159 IU/L (ref 40–128)
ALT SERPL-CCNC: 13 U/L (ref 10–40)
ANION GAP SERPL CALCULATED.3IONS-SCNC: 11 MMOL/L (ref 4–16)
AST SERPL-CCNC: 16 IU/L (ref 15–37)
BASOPHILS ABSOLUTE: 0.1 K/CU MM
BASOPHILS RELATIVE PERCENT: 0.4 % (ref 0–1)
BILIRUB SERPL-MCNC: 0.3 MG/DL (ref 0–1)
BUN SERPL-MCNC: 18 MG/DL (ref 6–23)
CALCIUM SERPL-MCNC: 9.5 MG/DL (ref 8.3–10.6)
CHLORIDE BLD-SCNC: 105 MMOL/L (ref 99–110)
CO2: 27 MMOL/L (ref 21–32)
CREAT SERPL-MCNC: 1.2 MG/DL (ref 0.9–1.3)
DIFFERENTIAL TYPE: ABNORMAL
EOSINOPHILS ABSOLUTE: 0.1 K/CU MM
EOSINOPHILS RELATIVE PERCENT: 0.9 % (ref 0–3)
GFR SERPL CREATININE-BSD FRML MDRD: >60 ML/MIN/1.73M2
GLUCOSE SERPL-MCNC: 102 MG/DL (ref 70–99)
HCT VFR BLD CALC: 41.7 % (ref 42–52)
HEMOGLOBIN: 13.5 GM/DL (ref 13.5–18)
LYMPHOCYTES ABSOLUTE: 1.5 K/CU MM
LYMPHOCYTES RELATIVE PERCENT: 13 % (ref 24–44)
MCH RBC QN AUTO: 27.3 PG (ref 27–31)
MCHC RBC AUTO-ENTMCNC: 32.4 % (ref 32–36)
MCV RBC AUTO: 84.2 FL (ref 78–100)
MONOCYTES ABSOLUTE: 0.9 K/CU MM
MONOCYTES RELATIVE PERCENT: 7.9 % (ref 0–4)
PDW BLD-RTO: 15.8 % (ref 11.7–14.9)
PLATELET # BLD: 219 K/CU MM (ref 140–440)
PMV BLD AUTO: 10.2 FL (ref 7.5–11.1)
POTASSIUM SERPL-SCNC: 4 MMOL/L (ref 3.5–5.1)
PSA ULTRASENSITIVE: 2.29 NG/ML (ref 0–4)
RBC # BLD: 4.95 M/CU MM (ref 4.6–6.2)
SEGMENTED NEUTROPHILS ABSOLUTE COUNT: 9.1 K/CU MM
SEGMENTED NEUTROPHILS RELATIVE PERCENT: 77.8 % (ref 36–66)
SODIUM BLD-SCNC: 143 MMOL/L (ref 135–145)
TOTAL PROTEIN: 6.1 GM/DL (ref 6.4–8.2)
WBC # BLD: 11.7 K/CU MM (ref 4–10.5)

## 2023-07-20 PROCEDURE — 80053 COMPREHEN METABOLIC PANEL: CPT

## 2023-07-20 PROCEDURE — 36415 COLL VENOUS BLD VENIPUNCTURE: CPT

## 2023-07-20 PROCEDURE — 85025 COMPLETE CBC W/AUTO DIFF WBC: CPT

## 2023-07-20 PROCEDURE — 84153 ASSAY OF PSA TOTAL: CPT

## 2023-07-27 ENCOUNTER — OFFICE VISIT (OUTPATIENT)
Dept: ONCOLOGY | Age: 73
End: 2023-07-27
Payer: COMMERCIAL

## 2023-07-27 ENCOUNTER — HOSPITAL ENCOUNTER (OUTPATIENT)
Dept: INFUSION THERAPY | Age: 73
Discharge: HOME | End: 2023-07-27
Payer: COMMERCIAL

## 2023-07-27 VITALS
TEMPERATURE: 97.8 F | HEART RATE: 82 BPM | WEIGHT: 203.6 LBS | OXYGEN SATURATION: 96 % | RESPIRATION RATE: 18 BRPM | SYSTOLIC BLOOD PRESSURE: 164 MMHG | HEIGHT: 72 IN | DIASTOLIC BLOOD PRESSURE: 96 MMHG | BODY MASS INDEX: 27.58 KG/M2

## 2023-07-27 DIAGNOSIS — C61 PROSTATE CANCER METASTATIC TO BONE (HCC): Primary | ICD-10-CM

## 2023-07-27 DIAGNOSIS — C79.51 PROSTATE CANCER METASTATIC TO BONE (HCC): Primary | ICD-10-CM

## 2023-07-27 PROCEDURE — G8427 DOCREV CUR MEDS BY ELIG CLIN: HCPCS | Performed by: INTERNAL MEDICINE

## 2023-07-27 PROCEDURE — 1123F ACP DISCUSS/DSCN MKR DOCD: CPT | Performed by: INTERNAL MEDICINE

## 2023-07-27 PROCEDURE — 99214 OFFICE O/P EST MOD 30 MIN: CPT | Performed by: INTERNAL MEDICINE

## 2023-07-27 PROCEDURE — G8417 CALC BMI ABV UP PARAM F/U: HCPCS | Performed by: INTERNAL MEDICINE

## 2023-07-27 PROCEDURE — 3017F COLORECTAL CA SCREEN DOC REV: CPT | Performed by: INTERNAL MEDICINE

## 2023-07-27 PROCEDURE — 1036F TOBACCO NON-USER: CPT | Performed by: INTERNAL MEDICINE

## 2023-07-27 PROCEDURE — 99211 OFF/OP EST MAY X REQ PHY/QHP: CPT

## 2023-07-27 NOTE — PROGRESS NOTES
MA Rooming Questions  Patient: Xiomara Watson  MRN: [id number]    Date: 7/27/2023        1. Do you have any new issues? yes - *Headaches on going;   *arms stay cold at night  *pt states he is not able to sleep on his R side anymore as he'll instantly need to go to the bathroom            2. Do you need any refills on medications?    no    3. Have you had any imaging done since your last visit?   no    4. Have you been hospitalized or seen in the emergency room since your last visit here?   no    5. Did the patient have a depression screening completed today?  No    No data recorded     PHQ-9 Given to (if applicable):               PHQ-9 Score (if applicable):                     [] Positive     []  Negative              Does question #9 need addressed (if applicable)                     [] Yes    []  No               Maggy Walsh MA

## 2023-07-31 DIAGNOSIS — C79.51 PROSTATE CANCER METASTATIC TO BONE (HCC): ICD-10-CM

## 2023-07-31 DIAGNOSIS — C61 PROSTATE CANCER METASTATIC TO BONE (HCC): ICD-10-CM

## 2023-07-31 RX ORDER — PREDNISONE 5 MG/1
TABLET ORAL
Qty: 60 TABLET | Refills: 5 | Status: SHIPPED | OUTPATIENT
Start: 2023-07-31

## 2023-09-25 RX ORDER — ONDANSETRON HYDROCHLORIDE 8 MG/1
8 TABLET, FILM COATED ORAL EVERY 8 HOURS PRN
Qty: 90 TABLET | Refills: 3 | Status: SHIPPED | OUTPATIENT
Start: 2023-09-25

## 2023-10-20 ENCOUNTER — HOSPITAL ENCOUNTER (OUTPATIENT)
Dept: INFUSION THERAPY | Age: 73
Discharge: HOME | End: 2023-10-20
Payer: COMMERCIAL

## 2023-10-20 LAB
ALBUMIN SERPL-MCNC: 4.1 GM/DL (ref 3.4–5)
ALP BLD-CCNC: 173 IU/L (ref 40–129)
ALT SERPL-CCNC: 9 U/L (ref 10–40)
ANION GAP SERPL CALCULATED.3IONS-SCNC: 15 MMOL/L (ref 4–16)
AST SERPL-CCNC: 13 IU/L (ref 15–37)
BASOPHILS ABSOLUTE: 0.1 K/CU MM
BASOPHILS RELATIVE PERCENT: 0.9 % (ref 0–1)
BILIRUB SERPL-MCNC: 0.4 MG/DL (ref 0–1)
BUN SERPL-MCNC: 17 MG/DL (ref 6–23)
CALCIUM SERPL-MCNC: 9.6 MG/DL (ref 8.3–10.6)
CHLORIDE BLD-SCNC: 105 MMOL/L (ref 99–110)
CO2: 25 MMOL/L (ref 21–32)
CREAT SERPL-MCNC: 1.2 MG/DL (ref 0.9–1.3)
DIFFERENTIAL TYPE: ABNORMAL
EOSINOPHILS ABSOLUTE: 0.2 K/CU MM
EOSINOPHILS RELATIVE PERCENT: 1.8 % (ref 0–3)
GFR SERPL CREATININE-BSD FRML MDRD: >60 ML/MIN/1.73M2
GLUCOSE SERPL-MCNC: 99 MG/DL (ref 70–99)
HCT VFR BLD CALC: 40.8 % (ref 42–52)
HEMOGLOBIN: 12.9 GM/DL (ref 13.5–18)
LYMPHOCYTES ABSOLUTE: 2.1 K/CU MM
LYMPHOCYTES RELATIVE PERCENT: 21.2 % (ref 24–44)
MCH RBC QN AUTO: 27.3 PG (ref 27–31)
MCHC RBC AUTO-ENTMCNC: 31.6 % (ref 32–36)
MCV RBC AUTO: 86.3 FL (ref 78–100)
MONOCYTES ABSOLUTE: 1.2 K/CU MM
MONOCYTES RELATIVE PERCENT: 12 % (ref 0–4)
PDW BLD-RTO: 14.4 % (ref 11.7–14.9)
PLATELET # BLD: 277 K/CU MM (ref 140–440)
PMV BLD AUTO: 10.9 FL (ref 7.5–11.1)
POTASSIUM SERPL-SCNC: 4.1 MMOL/L (ref 3.5–5.1)
RBC # BLD: 4.73 M/CU MM (ref 4.6–6.2)
SEGMENTED NEUTROPHILS ABSOLUTE COUNT: 6.2 K/CU MM
SEGMENTED NEUTROPHILS RELATIVE PERCENT: 64.1 % (ref 36–66)
SODIUM BLD-SCNC: 145 MMOL/L (ref 135–145)
TOTAL PROTEIN: 6.7 GM/DL (ref 6.4–8.2)
WBC # BLD: 9.7 K/CU MM (ref 4–10.5)

## 2023-10-20 PROCEDURE — 80053 COMPREHEN METABOLIC PANEL: CPT

## 2023-10-20 PROCEDURE — 85025 COMPLETE CBC W/AUTO DIFF WBC: CPT

## 2023-10-20 PROCEDURE — 36415 COLL VENOUS BLD VENIPUNCTURE: CPT

## 2023-10-20 PROCEDURE — G0103 PSA SCREENING: HCPCS

## 2023-10-23 LAB — PROSTATE SPECIFIC ANTIGEN: 15.93 NG/ML (ref 0–4)

## 2023-10-27 ENCOUNTER — OFFICE VISIT (OUTPATIENT)
Dept: ONCOLOGY | Age: 73
End: 2023-10-27
Payer: COMMERCIAL

## 2023-10-27 ENCOUNTER — HOSPITAL ENCOUNTER (OUTPATIENT)
Dept: INFUSION THERAPY | Age: 73
Discharge: HOME | End: 2023-10-27
Payer: COMMERCIAL

## 2023-10-27 VITALS
BODY MASS INDEX: 29.34 KG/M2 | HEART RATE: 113 BPM | SYSTOLIC BLOOD PRESSURE: 147 MMHG | OXYGEN SATURATION: 97 % | TEMPERATURE: 97.8 F | HEIGHT: 72 IN | DIASTOLIC BLOOD PRESSURE: 83 MMHG | RESPIRATION RATE: 18 BRPM | WEIGHT: 216.6 LBS

## 2023-10-27 DIAGNOSIS — C61 PROSTATE CANCER METASTATIC TO BONE (HCC): Primary | ICD-10-CM

## 2023-10-27 DIAGNOSIS — C79.51 PROSTATE CANCER METASTATIC TO BONE (HCC): Primary | ICD-10-CM

## 2023-10-27 PROCEDURE — 1123F ACP DISCUSS/DSCN MKR DOCD: CPT | Performed by: INTERNAL MEDICINE

## 2023-10-27 PROCEDURE — G8482 FLU IMMUNIZE ORDER/ADMIN: HCPCS | Performed by: INTERNAL MEDICINE

## 2023-10-27 PROCEDURE — 99211 OFF/OP EST MAY X REQ PHY/QHP: CPT

## 2023-10-27 PROCEDURE — G8427 DOCREV CUR MEDS BY ELIG CLIN: HCPCS | Performed by: INTERNAL MEDICINE

## 2023-10-27 PROCEDURE — 3017F COLORECTAL CA SCREEN DOC REV: CPT | Performed by: INTERNAL MEDICINE

## 2023-10-27 PROCEDURE — 99214 OFFICE O/P EST MOD 30 MIN: CPT | Performed by: INTERNAL MEDICINE

## 2023-10-27 PROCEDURE — 1036F TOBACCO NON-USER: CPT | Performed by: INTERNAL MEDICINE

## 2023-10-27 PROCEDURE — G8417 CALC BMI ABV UP PARAM F/U: HCPCS | Performed by: INTERNAL MEDICINE

## 2023-10-27 ASSESSMENT — PATIENT HEALTH QUESTIONNAIRE - PHQ9
SUM OF ALL RESPONSES TO PHQ QUESTIONS 1-9: 0
SUM OF ALL RESPONSES TO PHQ9 QUESTIONS 1 & 2: 0
SUM OF ALL RESPONSES TO PHQ QUESTIONS 1-9: 0
2. FEELING DOWN, DEPRESSED OR HOPELESS: 0
1. LITTLE INTEREST OR PLEASURE IN DOING THINGS: 0

## 2023-10-27 NOTE — PROGRESS NOTES
MA Rooming Questions  Patient: Clara Higgins  MRN: [id number]    Date: 10/27/2023        1. Do you have any new issues? yes - c/o pain in right hip, leg         2. Do you need any refills on medications?    no    3. Have you had any imaging done since your last visit?   no    4. Have you been hospitalized or seen in the emergency room since your last visit here?   no    5. Did the patient have a depression screening completed today?  yes  No data recorded     PHQ-9 Given to (if applicable):               PHQ-9 Score (if applicable):                     [] Positive     []  Negative              Does question #9 need addressed (if applicable)                     [] Yes    []  No               Pierce Gomes CMA
them well. I recognized that his PSA went up to 15.93 ng/ml on 10/20/23 labs. It was 2.29 ng/ml on 7/20/23. It is concerning for progression of disease and I will request PSMA PET/CT scan. Meanwhile, I recommend him to continue with abiraterone and prednisone regularly. He is scheduled for eligard injection and see Dr. Tj Murphy on 11/2023. Hypertension - his SBP is mildly elevated. Recommend to continue with metoprolol and amlodipnie. Bronchial asthma - he is on albuterol INH and wixela inbub. I answered all his questions and concerns for today. Recent imaging and labs were reviewed and discussed with the patient.

## 2023-11-12 NOTE — PROGRESS NOTES
Patient Name:  Pritesh Gonzalez  Patient :  1950  Patient MRN:  [id number]     Primary Oncologist: Willow Tubbs MD  Referring Provider: Daniel Carvajal MD     Date of Service: 2023     Chief Complaint:    No chief complaint on file. Patient's active problem list:        Metastatic prostate cancer    HPI:   Pritesh Gonzalez is a 72-year-old very pleasant gentleman with medical history significant for hypertension, hyperlipidemia, COPD, GERD, osteoarthritis and history of kidney stone, initially referred to me on 2022 for evaluation of metastatic prostate cancer. He initially presented to [facility] on 22 with abdominal pain and PVR was about 9 liter. CT scan on 22 showed cystitis, b/l moderate to severe HN, b/l renal stones. All the renal stones are non obstructive and his ARF resolved with koch catheter. He was also noted to have significant elevation in PSA (331 ng/ml on 22). It was 150 on 2022 and 5 on 2019. RACHEL at that time showed rock hard large prostate. MRI pelvis showed diffuse tumor involvement of the prostate gland, with loss of zonal anatomy, extraprostatic extension and involvement of the seminal vesicles and suspected involvement of the neurovascular bundles, PI-RADS 5. Enlarged pelvic and retroperitoneal lymph nodes consistent with willow metastasis. A larger right common iliac chain lymph node results in mass-effect on the right common iliac vein. Early mucosa enhancement of the urinary bladder. Differential includes a cystitis although involvement of the urinary bladder is not excluded given extensive involvement of the prostate gland. He has right lower extremity swelling. Doppler study was negative for DVT and Echo showed normal EF. RLE swelling is most likely due to mass effect from enlarged lymph node to right common iliac vein. Bone scan done on 2022 showed findings concerning for metastatic disease within the T10 vertebral body.

## 2023-11-13 ENCOUNTER — HOSPITAL ENCOUNTER (OUTPATIENT)
Dept: PET IMAGING | Age: 73
Discharge: HOME | End: 2023-11-13
Attending: INTERNAL MEDICINE
Payer: COMMERCIAL

## 2023-11-13 DIAGNOSIS — C79.51 PROSTATE CANCER METASTATIC TO BONE (HCC): ICD-10-CM

## 2023-11-13 DIAGNOSIS — C61 PROSTATE CANCER METASTATIC TO BONE (HCC): ICD-10-CM

## 2023-11-13 PROCEDURE — 78815 PET IMAGE W/CT SKULL-THIGH: CPT

## 2023-11-13 PROCEDURE — 3430000000 HC RX DIAGNOSTIC RADIOPHARMACEUTICAL: Performed by: INTERNAL MEDICINE

## 2023-11-13 PROCEDURE — 2580000003 HC RX 258: Performed by: INTERNAL MEDICINE

## 2023-11-13 PROCEDURE — A9595 HC RX DIAGNOSTIC RADIOPHARMACEUTICAL: HCPCS | Performed by: INTERNAL MEDICINE

## 2023-11-13 RX ORDER — SODIUM CHLORIDE 0.9 % (FLUSH) 0.9 %
10 SYRINGE (ML) INJECTION PRN
Status: COMPLETED | OUTPATIENT
Start: 2023-11-13 | End: 2023-11-13

## 2023-11-13 RX ADMIN — SODIUM CHLORIDE, PRESERVATIVE FREE 10 ML: 5 INJECTION INTRAVENOUS at 13:07

## 2023-11-13 RX ADMIN — PIFLUFOLASTAT F-18 8.63 MILLICURIE: 80 INJECTION INTRAVENOUS at 13:07

## 2023-11-17 ENCOUNTER — OFFICE VISIT (OUTPATIENT)
Dept: ONCOLOGY | Age: 73
End: 2023-11-17
Payer: COMMERCIAL

## 2023-11-17 ENCOUNTER — HOSPITAL ENCOUNTER (OUTPATIENT)
Dept: INFUSION THERAPY | Age: 73
Discharge: HOME | End: 2023-11-17
Payer: COMMERCIAL

## 2023-11-17 VITALS
BODY MASS INDEX: 29.88 KG/M2 | DIASTOLIC BLOOD PRESSURE: 80 MMHG | TEMPERATURE: 98 F | OXYGEN SATURATION: 97 % | HEIGHT: 72 IN | HEART RATE: 95 BPM | WEIGHT: 220.6 LBS | SYSTOLIC BLOOD PRESSURE: 165 MMHG

## 2023-11-17 DIAGNOSIS — C79.51 PROSTATE CANCER METASTATIC TO BONE (HCC): Primary | ICD-10-CM

## 2023-11-17 DIAGNOSIS — C61 PROSTATE CANCER METASTATIC TO BONE (HCC): Primary | ICD-10-CM

## 2023-11-17 PROCEDURE — 3017F COLORECTAL CA SCREEN DOC REV: CPT | Performed by: INTERNAL MEDICINE

## 2023-11-17 PROCEDURE — G8482 FLU IMMUNIZE ORDER/ADMIN: HCPCS | Performed by: INTERNAL MEDICINE

## 2023-11-17 PROCEDURE — 1123F ACP DISCUSS/DSCN MKR DOCD: CPT | Performed by: INTERNAL MEDICINE

## 2023-11-17 PROCEDURE — 99214 OFFICE O/P EST MOD 30 MIN: CPT | Performed by: INTERNAL MEDICINE

## 2023-11-17 PROCEDURE — 1036F TOBACCO NON-USER: CPT | Performed by: INTERNAL MEDICINE

## 2023-11-17 PROCEDURE — G8427 DOCREV CUR MEDS BY ELIG CLIN: HCPCS | Performed by: INTERNAL MEDICINE

## 2023-11-17 PROCEDURE — G8417 CALC BMI ABV UP PARAM F/U: HCPCS | Performed by: INTERNAL MEDICINE

## 2023-11-17 PROCEDURE — 99211 OFF/OP EST MAY X REQ PHY/QHP: CPT

## 2023-11-17 NOTE — PROGRESS NOTES
MA Rooming Questions  Patient: Arielle Dawn  MRN: [id number]    Date: 11/17/2023        1. Do you have any new issues?   no         2. Do you need any refills on medications?    no    3. Have you had any imaging done since your last visit? Pet PSMA 11/13    4. Have you been hospitalized or seen in the emergency room since your last visit here?   no    5. Did the patient have a depression screening completed today?  No    No data recorded     PHQ-9 Given to (if applicable):               PHQ-9 Score (if applicable):                     [] Positive     []  Negative              Does question #9 need addressed (if applicable)                     [] Yes    []  No               Santos Wolfe MA

## 2023-11-29 ENCOUNTER — TELEPHONE (OUTPATIENT)
Dept: ONCOLOGY | Age: 73
End: 2023-11-29

## 2023-11-29 NOTE — TELEPHONE ENCOUNTER
Patient called given time and prep for biopsy to be done on 12/7/23 [facility] arrival at [facility].

## 2023-12-05 NOTE — PROGRESS NOTES
IR Procedure at [facility]:  Spoke with patient and he will arrive at 0900 at [facility] on 12/7/2023 for his procedure at 1030. Also went over below instructions and told patient to take his medications as scheduled. NPO at [address]       2. Follow your directions as prescribed by the doctor for your procedure and medications. 3.   Consult your provider as to when to stop blood thinner  4. Do not take any pain medication within 6 hours of your procedure  5. Do not drink any alcoholic beverages or use any street drugs 24 hours before procedure. 6.   Please wear simple, loose fitting clothing to the hospital.  Do not bring valuables (money,             credit cards, checkbooks, etc.)     7. If you  have a Living Will and Durable Power of  for Healthcare, please bring in a copy. 8.   Please bring picture ID,  insurance card, paperwork from the doctors office            (H & P, Consent,  & card for implantable devices). 9.   Report to the information desk on the ground floor. 10. Take a shower the night before or morning of your procedure, do not apply any lotion, oil or powder. 11. If you are going to be sedated for the procedure, you will need a responsible adult to drive you home.

## 2023-12-07 ENCOUNTER — HOSPITAL ENCOUNTER (OUTPATIENT)
Dept: CT IMAGING | Age: 73
Discharge: HOME | End: 2023-12-07
Payer: COMMERCIAL

## 2023-12-07 VITALS
TEMPERATURE: 97.1 F | RESPIRATION RATE: 24 BRPM | SYSTOLIC BLOOD PRESSURE: 148 MMHG | DIASTOLIC BLOOD PRESSURE: 99 MMHG | HEART RATE: 88 BPM | OXYGEN SATURATION: 99 %

## 2023-12-07 DIAGNOSIS — C79.51 PROSTATE CANCER METASTATIC TO BONE (HCC): ICD-10-CM

## 2023-12-07 DIAGNOSIS — C61 PROSTATE CANCER METASTATIC TO BONE (HCC): ICD-10-CM

## 2023-12-07 LAB
APTT: 28.7 SECONDS (ref 25.1–37.1)
HCT VFR BLD CALC: 32.3 % (ref 42–52)
HEMOGLOBIN: 9.7 GM/DL (ref 13.5–18)
INR BLD: 1 INDEX
MCH RBC QN AUTO: 25.3 PG (ref 27–31)
MCHC RBC AUTO-ENTMCNC: 30 % (ref 32–36)
MCV RBC AUTO: 84.3 FL (ref 78–100)
PDW BLD-RTO: 14.4 % (ref 11.7–14.9)
PLATELET # BLD: 339 K/CU MM (ref 140–440)
PMV BLD AUTO: 10.3 FL (ref 7.5–11.1)
PROTHROMBIN TIME: 13.7 SECONDS (ref 11.7–14.5)
RBC # BLD: 3.83 M/CU MM (ref 4.6–6.2)
WBC # BLD: 8.4 K/CU MM (ref 4–10.5)

## 2023-12-07 PROCEDURE — 85610 PROTHROMBIN TIME: CPT

## 2023-12-07 PROCEDURE — 77012 CT SCAN FOR NEEDLE BIOPSY: CPT

## 2023-12-07 PROCEDURE — 85730 THROMBOPLASTIN TIME PARTIAL: CPT

## 2023-12-07 PROCEDURE — 6360000002 HC RX W HCPCS

## 2023-12-07 PROCEDURE — 85027 COMPLETE CBC AUTOMATED: CPT

## 2023-12-07 PROCEDURE — 2500000003 HC RX 250 WO HCPCS: Performed by: RADIOLOGY

## 2023-12-07 RX ORDER — LIDOCAINE HYDROCHLORIDE 10 MG/ML
INJECTION, SOLUTION EPIDURAL; INFILTRATION; INTRACAUDAL; PERINEURAL PRN
Status: COMPLETED | OUTPATIENT
Start: 2023-12-07 | End: 2023-12-07

## 2023-12-07 RX ADMIN — LIDOCAINE HYDROCHLORIDE 8 ML: 10 INJECTION, SOLUTION EPIDURAL; INFILTRATION; INTRACAUDAL; PERINEURAL at 10:23

## 2023-12-07 NOTE — H&P
Date:2023  [address]   NUT:[id number]   FL#:[id number]    SEX:male   Referring Physician: Keshav  Primary Physician:  Crystal Mcdaniel  Chief Complaint:  right iliac mass  History of Present Illness:   right iliac mass    HISTORY AND PHYSICAL  Prostate cancer metastatic to bone ([address]) [C61, C79.51]    Past Medical History:  No past medical history on file. Past Surgical History:  No past surgical history on file. Social History:  Social History     Socioeconomic History    Marital status: Unknown     Spouse name: Not on file    Number of children: Not on file    Years of education: Not on file    Highest education level: Not on file   Occupational History    Not on file   Tobacco Use    Smoking status: Former     Packs/day: 1.00     Years: 50.00     Additional pack years: 0.00     Total pack years: 50.00     Types: Cigarettes     Start date: 0     Quit date:      Years since quittin.9    Smokeless tobacco: Never   Substance and Sexual Activity    Alcohol use: Not Currently    Drug use: Not Currently    Sexual activity: Not on file   Other Topics Concern    Not on file   Social History Narrative    Not on file     Social Determinants of Health     Financial Resource Strain: Not on file   Food Insecurity: Not on file   Transportation Needs: Not on file   Physical Activity: Not on file   Stress: Not on file   Social Connections: Not on file   Intimate Partner Violence: Not on file   Housing Stability: Not on file       Family History:  No family history on file. Allergies:  No Known Allergies    Medications:  Current Outpatient Medications on File Prior to Encounter   Medication Sig Dispense Refill    ondansetron (ZOFRAN) 8 MG tablet TAKE 1 TABLET BY MOUTH EVERY 8 HOURS AS NEEDED FOR NAUSEA OR VOMITING TAKE 1 TABLET BY MOUTH EVERY 8 HOURS AS NEEDED FOR NAUSEA OR VOMITING.  90 tablet 3    predniSONE (DELTASONE) 5 MG tablet TAKE 1 TABLET BY MOUTH TWO TIMES DAILY EXCEPT THE DAY BEFORE AND DAY
Nephrology
Cardiology

## 2023-12-07 NOTE — PROGRESS NOTES
TRANSFER - OUT REPORT:    Verbal report given to Marily RN(name) on [facility] being transferred to [facility](unit) for routine post-op       Report consisted of patient's Situation, Background, Assessment and   Recommendations(SBAR). Information from the following report(s) Nurse Handoff Report was reviewed with the receiving nurse. Opportunity for questions and clarification was provided.       Patient transported with:   Registered Nurse

## 2023-12-07 NOTE — PROGRESS NOTES
Returned from procedure in IR. Tolerated well. Denies pain. Lower ABD drsg intact- without drng. Vitals stable. Preparing for discharge in an hour. Brother, Angela Pires, at bedside- update provided. Call light in reach.

## 2023-12-18 ENCOUNTER — HOSPITAL ENCOUNTER (OUTPATIENT)
Dept: INFUSION THERAPY | Age: 73
Discharge: HOME | End: 2023-12-18
Payer: COMMERCIAL

## 2023-12-18 DIAGNOSIS — C79.51 PROSTATE CANCER METASTATIC TO BONE (HCC): ICD-10-CM

## 2023-12-18 DIAGNOSIS — C61 PROSTATE CANCER METASTATIC TO BONE (HCC): ICD-10-CM

## 2023-12-18 LAB — PSA ULTRASENSITIVE: 74.17 NG/ML (ref 0–4)

## 2023-12-18 PROCEDURE — 36415 COLL VENOUS BLD VENIPUNCTURE: CPT

## 2023-12-18 PROCEDURE — 84403 ASSAY OF TOTAL TESTOSTERONE: CPT

## 2023-12-18 PROCEDURE — 84153 ASSAY OF PSA TOTAL: CPT

## 2023-12-18 PROCEDURE — 99211 OFF/OP EST MAY X REQ PHY/QHP: CPT

## 2023-12-21 LAB — TESTOST SERPL-MCNC: <3 NG/DL (ref 300–720)

## 2024-01-02 ENCOUNTER — TELEPHONE (OUTPATIENT)
Dept: ONCOLOGY | Age: 74
End: 2024-01-02

## 2024-01-02 NOTE — TELEPHONE ENCOUNTER
Called to inform of negative genetic results. No answer, LVM with call back number. Dr. Parr informed per chart message.

## 2024-01-04 DIAGNOSIS — C61 PROSTATE CANCER METASTATIC TO BONE (HCC): Primary | ICD-10-CM

## 2024-01-04 DIAGNOSIS — C79.51 PROSTATE CANCER METASTATIC TO BONE (HCC): Primary | ICD-10-CM

## 2024-01-04 NOTE — PROGRESS NOTES
This nurse called the patient @ [phone] to advise of the mutation noted on his CARIS testing and the need to add talazoparib to his regimen. Patient verbalized understanding and was advised he will receive a call from the treatment  to schedule chemo education.

## 2024-01-04 NOTE — PROGRESS NOTES
Talazoparib 0.5 mg take 1 tablet by mouth daily e-scribed to [facility] per verbal order from Dr. Parr.

## 2024-01-12 ENCOUNTER — CLINICAL DOCUMENTATION (OUTPATIENT)
Dept: ONCOLOGY | Age: 74
End: 2024-01-12

## 2024-01-12 NOTE — PROGRESS NOTES
This nurse called the patient to update him on that the talazoparib is still in process at [facility] pharmacy.The patient voiced understanding and denies any needs at this time.

## 2024-01-15 NOTE — PROGRESS NOTES
Patient Name:  Stoney Morejon  Patient :  1950  Patient MRN:  [id number]     Primary Oncologist: Rolf Parr MD  Referring Provider: Sal Ritter MD     Date of Service: 2024     Chief Complaint:    Chief Complaint   Patient presents with    Follow-up     Patient's active problem list:        Metastatic prostate cancer    HPI:   Stoney Morejon is a 73-year-old very pleasant gentleman with medical history significant for hypertension, hyperlipidemia, COPD, GERD, osteoarthritis and history of kidney stone, initially referred to me on 2022 for evaluation of metastatic prostate cancer.     He initially presented to [facility] on 22 with abdominal pain and PVR was about 9 liter. CT scan on 22 showed cystitis, b/l moderate to severe HN, b/l renal stones.     All the renal stones are non obstructive and his ARF resolved with koch catheter.     He was also noted to have significant elevation in PSA (331 ng/ml on 22). It was 150 on 2022 and 5 on 2019. RACHEL at that time showed rock hard large prostate.     MRI pelvis showed diffuse tumor involvement of the prostate gland, with loss of zonal anatomy, extraprostatic extension and involvement of the seminal vesicles and suspected involvement of the neurovascular bundles, PI-RADS 5.  Enlarged pelvic and retroperitoneal lymph nodes consistent with willow metastasis.  A larger right common iliac chain lymph node results in mass-effect on the right common iliac vein.  Early mucosa enhancement of the urinary bladder.  Differential includes a cystitis although involvement of the urinary bladder is not excluded given extensive involvement of the prostate gland.    He has right lower extremity swelling. Doppler study was negative for DVT and Echo showed normal EF. RLE swelling is most likely due to mass effect from enlarged lymph node to right common iliac vein.     Bone scan done on 2022 showed findings concerning for metastatic disease within the

## 2024-01-16 ENCOUNTER — CLINICAL DOCUMENTATION (OUTPATIENT)
Dept: ONCOLOGY | Age: 74
End: 2024-01-16

## 2024-01-16 ENCOUNTER — HOSPITAL ENCOUNTER (OUTPATIENT)
Dept: INFUSION THERAPY | Age: 74
Discharge: HOME | End: 2024-01-16
Payer: COMMERCIAL

## 2024-01-16 ENCOUNTER — OFFICE VISIT (OUTPATIENT)
Dept: ONCOLOGY | Age: 74
End: 2024-01-16
Payer: COMMERCIAL

## 2024-01-16 VITALS
HEART RATE: 107 BPM | BODY MASS INDEX: 30.02 KG/M2 | OXYGEN SATURATION: 96 % | HEIGHT: 72 IN | DIASTOLIC BLOOD PRESSURE: 89 MMHG | WEIGHT: 221.6 LBS | RESPIRATION RATE: 20 BRPM | SYSTOLIC BLOOD PRESSURE: 182 MMHG | TEMPERATURE: 97.5 F

## 2024-01-16 DIAGNOSIS — C61 PROSTATE CANCER METASTATIC TO BONE (HCC): Primary | ICD-10-CM

## 2024-01-16 DIAGNOSIS — C79.51 PROSTATE CANCER METASTATIC TO BONE (HCC): Primary | ICD-10-CM

## 2024-01-16 DIAGNOSIS — C79.51 PROSTATE CANCER METASTATIC TO BONE (HCC): ICD-10-CM

## 2024-01-16 DIAGNOSIS — C61 PROSTATE CANCER METASTATIC TO BONE (HCC): ICD-10-CM

## 2024-01-16 LAB
ALBUMIN SERPL-MCNC: 4 GM/DL (ref 3.4–5)
ALP BLD-CCNC: 148 IU/L (ref 40–128)
ALT SERPL-CCNC: 6 U/L (ref 10–40)
ANION GAP SERPL CALCULATED.3IONS-SCNC: 11 MMOL/L (ref 7–16)
AST SERPL-CCNC: 12 IU/L (ref 15–37)
BASOPHILS ABSOLUTE: 0.1 K/CU MM
BASOPHILS RELATIVE PERCENT: 1.6 % (ref 0–1)
BILIRUB SERPL-MCNC: 0.3 MG/DL (ref 0–1)
BUN SERPL-MCNC: 14 MG/DL (ref 6–23)
CALCIUM SERPL-MCNC: 9.1 MG/DL (ref 8.3–10.6)
CHLORIDE BLD-SCNC: 105 MMOL/L (ref 99–110)
CO2: 24 MMOL/L (ref 21–32)
CREAT SERPL-MCNC: 1.2 MG/DL (ref 0.9–1.3)
DIFFERENTIAL TYPE: ABNORMAL
EOSINOPHILS ABSOLUTE: 0.2 K/CU MM
EOSINOPHILS RELATIVE PERCENT: 3.7 % (ref 0–3)
GFR SERPL CREATININE-BSD FRML MDRD: >60 ML/MIN/1.73M2
GLUCOSE SERPL-MCNC: 101 MG/DL (ref 70–99)
HCT VFR BLD CALC: 29.9 % (ref 42–52)
HEMOGLOBIN: 9.1 GM/DL (ref 13.5–18)
LYMPHOCYTES ABSOLUTE: 1.6 K/CU MM
LYMPHOCYTES RELATIVE PERCENT: 25.1 % (ref 24–44)
MCH RBC QN AUTO: 22.5 PG (ref 27–31)
MCHC RBC AUTO-ENTMCNC: 30.4 % (ref 32–36)
MCV RBC AUTO: 73.8 FL (ref 78–100)
MONOCYTES ABSOLUTE: 0.8 K/CU MM
MONOCYTES RELATIVE PERCENT: 12.6 % (ref 0–4)
PDW BLD-RTO: 17 % (ref 11.7–14.9)
PLATELET # BLD: 337 K/CU MM (ref 140–440)
PMV BLD AUTO: 9.6 FL (ref 7.5–11.1)
POTASSIUM SERPL-SCNC: 4 MMOL/L (ref 3.5–5.1)
RBC # BLD: 4.05 M/CU MM (ref 4.6–6.2)
SEGMENTED NEUTROPHILS ABSOLUTE COUNT: 3.7 K/CU MM
SEGMENTED NEUTROPHILS RELATIVE PERCENT: 57 % (ref 36–66)
SODIUM BLD-SCNC: 140 MMOL/L (ref 135–145)
TOTAL PROTEIN: 6.1 GM/DL (ref 6.4–8.2)
WBC # BLD: 6.4 K/CU MM (ref 4–10.5)

## 2024-01-16 PROCEDURE — 85025 COMPLETE CBC W/AUTO DIFF WBC: CPT

## 2024-01-16 PROCEDURE — 1123F ACP DISCUSS/DSCN MKR DOCD: CPT | Performed by: INTERNAL MEDICINE

## 2024-01-16 PROCEDURE — 99214 OFFICE O/P EST MOD 30 MIN: CPT | Performed by: INTERNAL MEDICINE

## 2024-01-16 PROCEDURE — 1036F TOBACCO NON-USER: CPT | Performed by: INTERNAL MEDICINE

## 2024-01-16 PROCEDURE — 99211 OFF/OP EST MAY X REQ PHY/QHP: CPT

## 2024-01-16 PROCEDURE — 36415 COLL VENOUS BLD VENIPUNCTURE: CPT

## 2024-01-16 PROCEDURE — G8482 FLU IMMUNIZE ORDER/ADMIN: HCPCS | Performed by: INTERNAL MEDICINE

## 2024-01-16 PROCEDURE — G8427 DOCREV CUR MEDS BY ELIG CLIN: HCPCS | Performed by: INTERNAL MEDICINE

## 2024-01-16 PROCEDURE — G8417 CALC BMI ABV UP PARAM F/U: HCPCS | Performed by: INTERNAL MEDICINE

## 2024-01-16 PROCEDURE — 80053 COMPREHEN METABOLIC PANEL: CPT

## 2024-01-16 PROCEDURE — 3017F COLORECTAL CA SCREEN DOC REV: CPT | Performed by: INTERNAL MEDICINE

## 2024-01-16 NOTE — PROGRESS NOTES
MA Rooming Questions  Patient: Stoney Morejon  MRN: [id number]    Date: 1/16/2024        1. Do you have any new issues?   yes - hasn't received Talazoparib         2. Do you need any refills on medications?    no    3. Have you had any imaging done since your last visit?   no    4. Have you been hospitalized or seen in the emergency room since your last visit here?   no    5. Did the patient have a depression screening completed today? No    No data recorded     PHQ-9 Given to (if applicable):               PHQ-9 Score (if applicable):                     [] Positive     []  Negative              Does question #9 need addressed (if applicable)                     [] Yes    []  No               Nikki Kang CMA

## 2024-01-16 NOTE — PROGRESS NOTES
Patient in clinic for f/u appointment today 01/16/2024. Physician inquiring about status of talazoparib. Per latest [facility] note on 01/08/2024, \"Approved prescription was routed to [facility]. Prior authorization not required. Pharmacy will inform patient and provide them with dispensing pharmacy's phone number. Sending to [facility] for funding in case of high co-pay\". This RN called [facility] @ [phone] and informed that medication is currently in processing. Will continue to follow up.

## 2024-01-19 ENCOUNTER — CLINICAL DOCUMENTATION (OUTPATIENT)
Dept: ONCOLOGY | Age: 74
End: 2024-01-19

## 2024-01-19 NOTE — PROGRESS NOTES
Called [facility] @ [phone] to inquire about status of talazoparib. Informed that RX in process pending verification. This RN requested that RX be expedited. Voices understanding and will send expedited request to pharmacy. Will continue to follow up.

## 2024-01-19 NOTE — PROGRESS NOTES
Hereditary Cancer Risk Assessment progress note faxed to [facility]/Medical Records. Confirmation rec'd.

## 2024-01-29 ENCOUNTER — CLINICAL DOCUMENTATION (OUTPATIENT)
Dept: ONCOLOGY | Age: 74
End: 2024-01-29

## 2024-01-29 NOTE — PROGRESS NOTES
Correspondence sent to  at [facility] inquiring about status of PA on 01/24/2024. Advised that PA has been submitted for talazoparib. Determination pending. Another correspondence sent today 01/29/2024 to inquire if there is a final determination on PA. Awaiting response.

## 2024-01-30 ENCOUNTER — CLINICAL DOCUMENTATION (OUTPATIENT)
Dept: ONCOLOGY | Age: 74
End: 2024-01-30

## 2024-02-01 ENCOUNTER — CLINICAL DOCUMENTATION (OUTPATIENT)
Dept: ONCOLOGY | Age: 74
End: 2024-02-01

## 2024-02-06 ENCOUNTER — OFFICE VISIT (OUTPATIENT)
Dept: ONCOLOGY | Age: 74
End: 2024-02-06
Payer: COMMERCIAL

## 2024-02-06 ENCOUNTER — HOSPITAL ENCOUNTER (OUTPATIENT)
Dept: INFUSION THERAPY | Age: 74
Discharge: HOME | End: 2024-02-06
Payer: COMMERCIAL

## 2024-02-06 VITALS
TEMPERATURE: 97.8 F | SYSTOLIC BLOOD PRESSURE: 135 MMHG | DIASTOLIC BLOOD PRESSURE: 85 MMHG | HEIGHT: 72 IN | BODY MASS INDEX: 29.93 KG/M2 | HEART RATE: 106 BPM | OXYGEN SATURATION: 95 % | WEIGHT: 221 LBS

## 2024-02-06 DIAGNOSIS — C79.51 PROSTATE CANCER METASTATIC TO BONE (HCC): Primary | ICD-10-CM

## 2024-02-06 DIAGNOSIS — C61 PROSTATE CANCER METASTATIC TO BONE (HCC): Primary | ICD-10-CM

## 2024-02-06 DIAGNOSIS — C79.51 PROSTATE CANCER METASTATIC TO BONE (HCC): ICD-10-CM

## 2024-02-06 DIAGNOSIS — C61 PROSTATE CANCER METASTATIC TO BONE (HCC): ICD-10-CM

## 2024-02-06 LAB
ALBUMIN SERPL-MCNC: 4 GM/DL (ref 3.4–5)
ALP BLD-CCNC: 154 IU/L (ref 40–128)
ALT SERPL-CCNC: 8 U/L (ref 10–40)
ANION GAP SERPL CALCULATED.3IONS-SCNC: 12 MMOL/L (ref 7–16)
AST SERPL-CCNC: 14 IU/L (ref 15–37)
BASOPHILS ABSOLUTE: 0.1 K/CU MM
BASOPHILS RELATIVE PERCENT: 1.4 % (ref 0–1)
BILIRUB SERPL-MCNC: 0.2 MG/DL (ref 0–1)
BUN SERPL-MCNC: 17 MG/DL (ref 6–23)
CALCIUM SERPL-MCNC: 9.1 MG/DL (ref 8.3–10.6)
CHLORIDE BLD-SCNC: 106 MMOL/L (ref 99–110)
CO2: 24 MMOL/L (ref 21–32)
CREAT SERPL-MCNC: 1.3 MG/DL (ref 0.9–1.3)
DIFFERENTIAL TYPE: ABNORMAL
EOSINOPHILS ABSOLUTE: 0.2 K/CU MM
EOSINOPHILS RELATIVE PERCENT: 3.4 % (ref 0–3)
GFR SERPL CREATININE-BSD FRML MDRD: 58 ML/MIN/1.73M2
GLUCOSE SERPL-MCNC: 105 MG/DL (ref 70–99)
HCT VFR BLD CALC: 32 % (ref 42–52)
HEMOGLOBIN: 9.6 GM/DL (ref 13.5–18)
LYMPHOCYTES ABSOLUTE: 1.5 K/CU MM
LYMPHOCYTES RELATIVE PERCENT: 21 % (ref 24–44)
MCH RBC QN AUTO: 21.4 PG (ref 27–31)
MCHC RBC AUTO-ENTMCNC: 30 % (ref 32–36)
MCV RBC AUTO: 71.3 FL (ref 78–100)
MONOCYTES ABSOLUTE: 0.7 K/CU MM
MONOCYTES RELATIVE PERCENT: 10.2 % (ref 0–4)
PDW BLD-RTO: 17.6 % (ref 11.7–14.9)
PLATELET # BLD: 326 K/CU MM (ref 140–440)
PMV BLD AUTO: 10 FL (ref 7.5–11.1)
POTASSIUM SERPL-SCNC: 4.2 MMOL/L (ref 3.5–5.1)
PSA ULTRASENSITIVE: 60.66 NG/ML (ref 0–4)
RBC # BLD: 4.49 M/CU MM (ref 4.6–6.2)
SEGMENTED NEUTROPHILS ABSOLUTE COUNT: 4.5 K/CU MM
SEGMENTED NEUTROPHILS RELATIVE PERCENT: 64 % (ref 36–66)
SODIUM BLD-SCNC: 142 MMOL/L (ref 135–145)
TOTAL PROTEIN: 6.1 GM/DL (ref 6.4–8.2)
WBC # BLD: 7 K/CU MM (ref 4–10.5)

## 2024-02-06 PROCEDURE — 84153 ASSAY OF PSA TOTAL: CPT

## 2024-02-06 PROCEDURE — 36415 COLL VENOUS BLD VENIPUNCTURE: CPT

## 2024-02-06 PROCEDURE — 3017F COLORECTAL CA SCREEN DOC REV: CPT | Performed by: INTERNAL MEDICINE

## 2024-02-06 PROCEDURE — 99211 OFF/OP EST MAY X REQ PHY/QHP: CPT

## 2024-02-06 PROCEDURE — 85025 COMPLETE CBC W/AUTO DIFF WBC: CPT

## 2024-02-06 PROCEDURE — 80053 COMPREHEN METABOLIC PANEL: CPT

## 2024-02-06 PROCEDURE — 99214 OFFICE O/P EST MOD 30 MIN: CPT | Performed by: INTERNAL MEDICINE

## 2024-02-06 PROCEDURE — G8417 CALC BMI ABV UP PARAM F/U: HCPCS | Performed by: INTERNAL MEDICINE

## 2024-02-06 PROCEDURE — G8427 DOCREV CUR MEDS BY ELIG CLIN: HCPCS | Performed by: INTERNAL MEDICINE

## 2024-02-06 PROCEDURE — 1123F ACP DISCUSS/DSCN MKR DOCD: CPT | Performed by: INTERNAL MEDICINE

## 2024-02-06 PROCEDURE — 1036F TOBACCO NON-USER: CPT | Performed by: INTERNAL MEDICINE

## 2024-02-06 PROCEDURE — G8482 FLU IMMUNIZE ORDER/ADMIN: HCPCS | Performed by: INTERNAL MEDICINE

## 2024-02-06 NOTE — PROGRESS NOTES
MA Rooming Questions  Patient: Stoney Morejon  MRN: [id number]    Date: 2/6/2024        1. Do you have any new issues?   no         2. Do you need any refills on medications?    no    3. Have you had any imaging done since your last visit?   no    4. Have you been hospitalized or seen in the emergency room since your last visit here?   no    5. Did the patient have a depression screening completed today? No    No data recorded     PHQ-9 Given to (if applicable):               PHQ-9 Score (if applicable):                     [] Positive     []  Negative              Does question #9 need addressed (if applicable)                     [] Yes    []  No               Leah Griggs CMA      
casodex on 8/19/22.     He underwent transurethral resection of prostate cancer on 10/13/22 and pathology showed prostate adenocarcinoma, fabi score 4+4=8, (grade group 4, 12/15 cores, involving approximately 30% of tissue present.     Dr. Lenz started ADT with eligard since early November 2022.     Chemotherapy with docetaxel and abiraterone were started on 12/5/22 and he completed it on 3/20/23.     PSMA PET scan on 11/13/23 showed PSMA avid lymph node metastases in the bilateral pelvic and retroperitoneal regions extending superiorly to the level of the lower pole of the kidneys.    He underwent CT guided biopsy of right iliac mass on 12/7/23 and pathology showed adenocarcinoma consistent with prostate primary.     On February 6, 2024, he presented to me for follow-up. I have been following him for prostate cancer.    Clinically, he has castration sensitive prostate cancer, grade group 4 and I reviewed with him all the treatment options.     I recommend ADT with docetaxel and abiraterone. I discussed his case with Dr. Lenz and Eligard was started early November 2022.     Docetaxel and abiraterone were started on 12/5/2022. He completed docetaxel on 3/20/23. He has been on eligard, abiraterone and prednisone since then.     I recognized that his PSA went up to 15.93 ng/ml on 10/20/23 labs. It was 2.29 ng/ml on 7/20/23.     It is concerning for progression of disease and I requested PSMA PET/CT scan. It was done on 11/13/23 and it showed multiple hypermetabolic retroperitoneal and pelvic lymphadenopathy consistent with progression of disease.     His testosterone level today was in castrate level. Since repeat biopsy doesn't show transformation, I discussed with him about further line of therapy.     I recommend him to stop abiraterone. We decided to start second line therapy with darolutamide. It was started on 1/5/2024.     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

## 2024-02-16 ENCOUNTER — CLINICAL DOCUMENTATION (OUTPATIENT)
Dept: ONCOLOGY | Age: 74
End: 2024-02-16

## 2024-02-16 NOTE — PROGRESS NOTES
Called patient @ [phone] to inquire about oral chemo. Patient confirmed he received talazoparib on 02/05/2024 and has already started taking medication. Patient denies any side effects other than increased fatigue. Patient advised that he sill need to come to clinic for education and to sign consent. Patient voices understanding and would prefer to come on Wednesday 02/21/2021 @ 1000.  updated. No further needs addressed at this time.

## 2024-03-06 ENCOUNTER — OFFICE VISIT (OUTPATIENT)
Dept: ONCOLOGY | Age: 74
End: 2024-03-06

## 2024-03-06 ENCOUNTER — HOSPITAL ENCOUNTER (OUTPATIENT)
Dept: INFUSION THERAPY | Age: 74
Discharge: HOME | End: 2024-03-06
Payer: COMMERCIAL

## 2024-03-06 VITALS
WEIGHT: 211.4 LBS | OXYGEN SATURATION: 96 % | SYSTOLIC BLOOD PRESSURE: 152 MMHG | TEMPERATURE: 97.6 F | DIASTOLIC BLOOD PRESSURE: 75 MMHG | HEART RATE: 97 BPM | HEIGHT: 72 IN | BODY MASS INDEX: 28.63 KG/M2

## 2024-03-06 DIAGNOSIS — C61 PROSTATE CANCER METASTATIC TO BONE (HCC): ICD-10-CM

## 2024-03-06 DIAGNOSIS — C79.51 PROSTATE CANCER METASTATIC TO BONE (HCC): Primary | ICD-10-CM

## 2024-03-06 DIAGNOSIS — C61 PROSTATE CANCER METASTATIC TO BONE (HCC): Primary | ICD-10-CM

## 2024-03-06 DIAGNOSIS — C79.51 PROSTATE CANCER METASTATIC TO BONE (HCC): ICD-10-CM

## 2024-03-06 LAB
ALBUMIN SERPL-MCNC: 3.6 GM/DL (ref 3.4–5)
ALP BLD-CCNC: 135 IU/L (ref 40–128)
ALT SERPL-CCNC: 9 U/L (ref 10–40)
ANION GAP SERPL CALCULATED.3IONS-SCNC: 9 MMOL/L (ref 7–16)
AST SERPL-CCNC: 12 IU/L (ref 15–37)
BASOPHILS ABSOLUTE: 0.1 K/CU MM
BASOPHILS RELATIVE PERCENT: 1.1 % (ref 0–1)
BILIRUB SERPL-MCNC: 0.4 MG/DL (ref 0–1)
BUN SERPL-MCNC: 17 MG/DL (ref 6–23)
CALCIUM SERPL-MCNC: 9.3 MG/DL (ref 8.3–10.6)
CHLORIDE BLD-SCNC: 108 MMOL/L (ref 99–110)
CO2: 26 MMOL/L (ref 21–32)
CREAT SERPL-MCNC: 1.2 MG/DL (ref 0.9–1.3)
DIFFERENTIAL TYPE: ABNORMAL
EOSINOPHILS ABSOLUTE: 0.2 K/CU MM
EOSINOPHILS RELATIVE PERCENT: 3.1 % (ref 0–3)
FERRITIN: 101 NG/ML (ref 30–400)
FOLATE SERPL-MCNC: 17.5 NG/ML (ref 3.1–17.5)
GFR SERPL CREATININE-BSD FRML MDRD: >60 ML/MIN/1.73M2
GLUCOSE SERPL-MCNC: 104 MG/DL (ref 70–99)
HCT VFR BLD CALC: 29.8 % (ref 42–52)
HEMOGLOBIN: 9.1 GM/DL (ref 13.5–18)
IRON: 25 UG/DL (ref 59–158)
LACTATE DEHYDROGENASE: 184 IU/L (ref 120–246)
LYMPHOCYTES ABSOLUTE: 1 K/CU MM
LYMPHOCYTES RELATIVE PERCENT: 15.2 % (ref 24–44)
MCH RBC QN AUTO: 22.5 PG (ref 27–31)
MCHC RBC AUTO-ENTMCNC: 30.5 % (ref 32–36)
MCV RBC AUTO: 73.8 FL (ref 78–100)
MONOCYTES ABSOLUTE: 0.8 K/CU MM
MONOCYTES RELATIVE PERCENT: 11.7 % (ref 0–4)
PCT TRANSFERRIN: 8 % (ref 10–44)
PDW BLD-RTO: 24.9 % (ref 11.7–14.9)
PLATELET # BLD: 346 K/CU MM (ref 140–440)
PMV BLD AUTO: 9.3 FL (ref 7.5–11.1)
POTASSIUM SERPL-SCNC: 4.6 MMOL/L (ref 3.5–5.1)
PSA ULTRASENSITIVE: 57.7 NG/ML (ref 0–4)
RBC # BLD: 4.04 M/CU MM (ref 4.6–6.2)
RETICULOCYTE COUNT PCT: 1.7 % (ref 0.2–2.2)
SEGMENTED NEUTROPHILS ABSOLUTE COUNT: 4.5 K/CU MM
SEGMENTED NEUTROPHILS RELATIVE PERCENT: 68.9 % (ref 36–66)
SODIUM BLD-SCNC: 143 MMOL/L (ref 135–145)
TOTAL IRON BINDING CAPACITY: 304 UG/DL (ref 250–450)
TOTAL PROTEIN: 6 GM/DL (ref 6.4–8.2)
UNSATURATED IRON BINDING CAPACITY: 279 UG/DL (ref 110–370)
VITAMIN B-12: 332.7 PG/ML (ref 211–911)
WBC # BLD: 6.5 K/CU MM (ref 4–10.5)

## 2024-03-06 PROCEDURE — 84153 ASSAY OF PSA TOTAL: CPT

## 2024-03-06 PROCEDURE — 82728 ASSAY OF FERRITIN: CPT

## 2024-03-06 PROCEDURE — 83550 IRON BINDING TEST: CPT

## 2024-03-06 PROCEDURE — 36415 COLL VENOUS BLD VENIPUNCTURE: CPT

## 2024-03-06 PROCEDURE — 82607 VITAMIN B-12: CPT

## 2024-03-06 PROCEDURE — 85025 COMPLETE CBC W/AUTO DIFF WBC: CPT

## 2024-03-06 PROCEDURE — 85045 AUTOMATED RETICULOCYTE COUNT: CPT

## 2024-03-06 PROCEDURE — 80053 COMPREHEN METABOLIC PANEL: CPT

## 2024-03-06 PROCEDURE — 82746 ASSAY OF FOLIC ACID SERUM: CPT

## 2024-03-06 PROCEDURE — 83615 LACTATE (LD) (LDH) ENZYME: CPT

## 2024-03-06 PROCEDURE — 99211 OFF/OP EST MAY X REQ PHY/QHP: CPT

## 2024-03-06 PROCEDURE — 83540 ASSAY OF IRON: CPT

## 2024-03-06 NOTE — PROGRESS NOTES
MA Rooming Questions  Patient: Stoney Morejon  MRN: [id number]    Date: 3/6/2024        1. Do you have any new issues?   no         2. Do you need any refills on medications?    Yes - Darolutamide    3. Have you had any imaging done since your last visit?   yes - [facility]    4. Have you been hospitalized or seen in the emergency room since your last visit here?   yes - [facility]    5. Did the patient have a depression screening completed today? No    No data recorded     PHQ-9 Given to (if applicable):               PHQ-9 Score (if applicable):                     [] Positive     []  Negative              Does question #9 need addressed (if applicable)                     [] Yes    []  No               Alea Jenkins CMA      
pain, hemoptysis or palpitations. His bowel and bladder functions are normal. He denies nausea, vomiting, abdominal pain, diarrhea, constipation, dysuria, loss of appetite or weight loss. He denies neuropathy and he doesn't have bleeding or clotting issues. He denies any pain in his body. No anxiety or depression. The rest of the systems are unremarkable.     Vital Signs: BP (!) 152/75 (Site: Right Upper Arm, Position: Sitting, Cuff Size: Medium Adult)   Pulse 97   Temp 97.6 °F (36.4 °C) (Infrared)   Ht 1.829 m (6')   Wt 95.9 kg (211 lb 6.4 oz)   SpO2 96%   BMI 28.67 kg/m²      Physical Exam:  CONSTITUTIONAL: awake, alert, cooperative, no apparent distress   EYES: pupils equal, round and reactive to light, sclera clear, normal conjunctiva  ENT: Normocephalic, without obvious abnormality, atraumatic  NECK: supple, symmetrical, no jugular venous distension, no carotid bruits   HEMATOLOGIC/LYMPHATIC: no cervical, supraclavicular or axillary lymphadenopathy   LUNGS: VBS, no wheezes, no increased work of breathing, no rhonchi, clear to auscultation, no crackles,    CARDIOVASCULAR: regular rate and rhythm, normal S1 and S2, no murmur noted  ABDOMEN: normal bowel sounds x 4, soft, non-distended, non-tender, no masses palpated, no hepatosplenomegaly   MUSCULOSKELETAL: full range of motion noted, tone is normal  NEUROLOGIC: awake, alert, oriented to name, place and time. Motor skills grossly intact.   SKIN: appears intact, normal skin color, normal texture, normal turgor, no jaundice.   EXTREMITIES: RLE swelling +, no clubbing, no cyanosis,        Labs:  Hematology:  Lab Results   Component Value Date    WBC 6.5 03/06/2024    RBC 4.04 (L) 03/06/2024    HGB 9.1 (L) 03/06/2024    HCT 29.8 (L) 03/06/2024    MCV 73.8 (L) 03/06/2024    MCH 22.5 (L) 03/06/2024    MCHC 30.5 (L) 03/06/2024    RDW 24.9 (H) 03/06/2024     03/06/2024    MPV 9.3 03/06/2024    SEGSPCT 68.9 (H) 03/06/2024    EOSRELPCT 3.1 (H) 03/06/2024

## 2024-03-06 NOTE — PROGRESS NOTES
RX for darolutamide reordered and e-scribed to [email or web address]. Medication was previously being filled by [facility], but patient requesting that medication be filled by 1 pharmacy instead of 2 since he already receives talazoparib through [facility]. Correspondence sent to Specialty liaison to notify.     Received correspondence back from liaison and advised that [facility] does not carry [facility]. RX updated and routed to [facility].

## 2024-04-03 ENCOUNTER — OFFICE VISIT (OUTPATIENT)
Dept: ONCOLOGY | Age: 74
End: 2024-04-03

## 2024-04-03 ENCOUNTER — HOSPITAL ENCOUNTER (OUTPATIENT)
Dept: INFUSION THERAPY | Age: 74
Discharge: HOME | End: 2024-04-03
Payer: COMMERCIAL

## 2024-04-03 VITALS
WEIGHT: 221.2 LBS | OXYGEN SATURATION: 95 % | HEIGHT: 72 IN | RESPIRATION RATE: 16 BRPM | BODY MASS INDEX: 29.96 KG/M2 | TEMPERATURE: 97.8 F | DIASTOLIC BLOOD PRESSURE: 71 MMHG | SYSTOLIC BLOOD PRESSURE: 142 MMHG | HEART RATE: 97 BPM

## 2024-04-03 DIAGNOSIS — C61 PROSTATE CANCER METASTATIC TO BONE (HCC): Primary | ICD-10-CM

## 2024-04-03 DIAGNOSIS — C79.51 PROSTATE CANCER METASTATIC TO BONE (HCC): Primary | ICD-10-CM

## 2024-04-03 DIAGNOSIS — C79.51 PROSTATE CANCER METASTATIC TO BONE (HCC): ICD-10-CM

## 2024-04-03 DIAGNOSIS — C61 PROSTATE CANCER METASTATIC TO BONE (HCC): ICD-10-CM

## 2024-04-03 LAB
ALBUMIN SERPL-MCNC: 3.8 GM/DL (ref 3.4–5)
ALP BLD-CCNC: 135 IU/L (ref 40–128)
ALT SERPL-CCNC: 8 U/L (ref 10–40)
ANION GAP SERPL CALCULATED.3IONS-SCNC: 12 MMOL/L (ref 7–16)
AST SERPL-CCNC: 11 IU/L (ref 15–37)
BASOPHILS ABSOLUTE: 0.1 K/CU MM
BASOPHILS RELATIVE PERCENT: 1.2 % (ref 0–1)
BILIRUB SERPL-MCNC: 0.3 MG/DL (ref 0–1)
BUN SERPL-MCNC: 15 MG/DL (ref 6–23)
CALCIUM SERPL-MCNC: 9.1 MG/DL (ref 8.3–10.6)
CHLORIDE BLD-SCNC: 106 MMOL/L (ref 99–110)
CO2: 24 MMOL/L (ref 21–32)
CREAT SERPL-MCNC: 1.2 MG/DL (ref 0.9–1.3)
DIFFERENTIAL TYPE: ABNORMAL
EOSINOPHILS ABSOLUTE: 0.3 K/CU MM
EOSINOPHILS RELATIVE PERCENT: 6.3 % (ref 0–3)
GFR SERPL CREATININE-BSD FRML MDRD: 64 ML/MIN/1.73M2
GLUCOSE SERPL-MCNC: 102 MG/DL (ref 70–99)
HCT VFR BLD CALC: 30.6 % (ref 42–52)
HEMOGLOBIN: 9.5 GM/DL (ref 13.5–18)
LYMPHOCYTES ABSOLUTE: 1.1 K/CU MM
LYMPHOCYTES RELATIVE PERCENT: 20.9 % (ref 24–44)
MCH RBC QN AUTO: 23.3 PG (ref 27–31)
MCHC RBC AUTO-ENTMCNC: 31 % (ref 32–36)
MCV RBC AUTO: 75 FL (ref 78–100)
MONOCYTES ABSOLUTE: 0.6 K/CU MM
MONOCYTES RELATIVE PERCENT: 11.6 % (ref 0–4)
PDW BLD-RTO: 26.8 % (ref 11.7–14.9)
PLATELET # BLD: 233 K/CU MM (ref 140–440)
PMV BLD AUTO: 9.1 FL (ref 7.5–11.1)
POTASSIUM SERPL-SCNC: 4.5 MMOL/L (ref 3.5–5.1)
PSA ULTRASENSITIVE: 36.6 NG/ML (ref 0–4)
RBC # BLD: 4.08 M/CU MM (ref 4.6–6.2)
SEGMENTED NEUTROPHILS ABSOLUTE COUNT: 3 K/CU MM
SEGMENTED NEUTROPHILS RELATIVE PERCENT: 60 % (ref 36–66)
SODIUM BLD-SCNC: 142 MMOL/L (ref 135–145)
TOTAL PROTEIN: 6 GM/DL (ref 6.4–8.2)
WBC # BLD: 5.1 K/CU MM (ref 4–10.5)

## 2024-04-03 PROCEDURE — 36415 COLL VENOUS BLD VENIPUNCTURE: CPT

## 2024-04-03 PROCEDURE — 80053 COMPREHEN METABOLIC PANEL: CPT

## 2024-04-03 PROCEDURE — 84153 ASSAY OF PSA TOTAL: CPT

## 2024-04-03 PROCEDURE — 99211 OFF/OP EST MAY X REQ PHY/QHP: CPT

## 2024-04-03 PROCEDURE — 85025 COMPLETE CBC W/AUTO DIFF WBC: CPT

## 2024-04-03 RX ORDER — FERROUS SULFATE 325(65) MG
325 TABLET ORAL
Qty: 30 TABLET | Refills: 5 | Status: SHIPPED | OUTPATIENT
Start: 2024-04-03

## 2024-04-03 NOTE — PROGRESS NOTES
MA Rooming Questions  Patient: Stoney Morejon  MRN: [id number]    Date: 4/3/2024        1. Do you have any new issues?   no         2. Do you need any refills on medications?    no    3. Have you had any imaging done since your last visit?   no    4. Have you been hospitalized or seen in the emergency room since your last visit here?   no    5. Did the patient have a depression screening completed today? No    No data recorded     PHQ-9 Given to (if applicable):               PHQ-9 Score (if applicable):                     [] Positive     []  Negative              Does question #9 need addressed (if applicable)                     [] Yes    []  No               Nikki Kang CMA      
04/03/2024    EOSRELPCT 6.3 (H) 04/03/2024    BASOPCT 1.2 (H) 04/03/2024    LYMPHOPCT 20.9 (L) 04/03/2024    MONOPCT 11.6 (H) 04/03/2024    SEGSABS 3.0 04/03/2024    EOSABS 0.3 04/03/2024    BASOSABS 0.1 04/03/2024    LYMPHSABS 1.1 04/03/2024    MONOSABS 0.6 04/03/2024    DIFFTYPE AUTOMATED DIFFERENTIAL 04/03/2024     No results found for: \"ESR\"  Chemistry:  Lab Results   Component Value Date     03/06/2024    K 4.6 03/06/2024     03/06/2024    CO2 26 03/06/2024    BUN 17 03/06/2024    CREATININE 1.2 03/06/2024    GLUCOSE 104 (H) 03/06/2024    CALCIUM 9.3 03/06/2024    PROT 6.0 (L) 03/06/2024    LABALBU 3.6 03/06/2024    BILITOT 0.4 03/06/2024    ALKPHOS 135 (H) 03/06/2024    AST 12 (L) 03/06/2024    ALT 9 (L) 03/06/2024    LABGLOM >60 03/06/2024     Lab Results   Component Value Date     03/06/2024     No results found for: \"LD\"  No results found for: \"TSHHS\", \"T4FREE\", \"FT3\"  Immunology:  Lab Results   Component Value Date    PROT 6.0 (L) 03/06/2024     No results found for: \"KAPPAUVOL\", \"LAMBDAUVOL\", \"KLFLCR\"  No results found for: \"B2M\"  Coagulation Panel:  Lab Results   Component Value Date    PROTIME 13.7 12/07/2023    INR 1.0 12/07/2023    APTT 28.7 12/07/2023     Anemia Panel:  Lab Results   Component Value Date    UDGCDYTR11 332.7 03/06/2024    FOLATE 17.5 03/06/2024     Tumor Markers:  Lab Results   Component Value Date    PSA 15.93 (H) 10/20/2023      Observations:  No data recorded    Assessment   Castration naive metastatic prostate cancer    Plan:  Stoney Morejon is a 72-year-old very pleasant gentleman who initially presented to [facility] with urinary outlet obstruction.     Further work up showed that he has rock hard large prostate on RACHEL with significantly elevated PSA (331).     MRI showed multiple abdominal and pelvic lymphadenopathy. One of the right pelvic lymph node has mass effect of right common iliac vein and causing him significant right leg swelling. Bone scan showed

## 2024-04-04 ENCOUNTER — CLINICAL DOCUMENTATION (OUTPATIENT)
Dept: ONCOLOGY | Age: 74
End: 2024-04-04

## 2024-04-04 NOTE — PROGRESS NOTES
This nurse called the patient @ [phone] to review lab results. However there was no answer. This nurse left a VM for the patient advising of his PSA dropping and that he is showing good response to the current therapy. This nurse also advised that a copy of the labs will be sent to Dr Lenz's office. This RN's direct number left should the patient have any questions.

## 2024-06-12 RX ORDER — FERROUS SULFATE 325(65) MG
1 TABLET ORAL
Qty: 90 TABLET | Refills: 1 | OUTPATIENT
Start: 2024-06-12

## 2024-06-28 DIAGNOSIS — C79.51 PROSTATE CANCER METASTATIC TO BONE (HCC): ICD-10-CM

## 2024-06-28 DIAGNOSIS — C61 PROSTATE CANCER METASTATIC TO BONE (HCC): ICD-10-CM

## 2024-06-28 RX ORDER — TALAZOPARIB 0.5 MG/1
CAPSULE, LIQUID FILLED ORAL
Qty: 30 CAPSULE | Refills: 5 | Status: ACTIVE | OUTPATIENT
Start: 2024-06-28

## 2024-07-08 ENCOUNTER — HOSPITAL ENCOUNTER (OUTPATIENT)
Dept: INFUSION THERAPY | Age: 74
Discharge: HOME | End: 2024-07-08
Payer: COMMERCIAL

## 2024-07-08 ENCOUNTER — OFFICE VISIT (OUTPATIENT)
Dept: ONCOLOGY | Age: 74
End: 2024-07-08
Payer: COMMERCIAL

## 2024-07-08 VITALS
DIASTOLIC BLOOD PRESSURE: 107 MMHG | BODY MASS INDEX: 30.5 KG/M2 | TEMPERATURE: 97.6 F | WEIGHT: 225.2 LBS | HEART RATE: 98 BPM | HEIGHT: 72 IN | OXYGEN SATURATION: 92 % | SYSTOLIC BLOOD PRESSURE: 137 MMHG

## 2024-07-08 DIAGNOSIS — C61 PROSTATE CANCER METASTATIC TO BONE (HCC): Primary | ICD-10-CM

## 2024-07-08 DIAGNOSIS — C79.51 PROSTATE CANCER METASTATIC TO BONE (HCC): ICD-10-CM

## 2024-07-08 DIAGNOSIS — C79.51 PROSTATE CANCER METASTATIC TO BONE (HCC): Primary | ICD-10-CM

## 2024-07-08 DIAGNOSIS — C61 PROSTATE CANCER METASTATIC TO BONE (HCC): ICD-10-CM

## 2024-07-08 LAB
ALBUMIN SERPL-MCNC: 4 GM/DL (ref 3.4–5)
ALP BLD-CCNC: 172 IU/L (ref 40–128)
ALT SERPL-CCNC: 7 U/L (ref 10–40)
ANION GAP SERPL CALCULATED.3IONS-SCNC: 14 MMOL/L (ref 7–16)
AST SERPL-CCNC: 11 IU/L (ref 15–37)
BASOPHILS ABSOLUTE: 0 K/CU MM
BASOPHILS RELATIVE PERCENT: 0.5 % (ref 0–1)
BILIRUB SERPL-MCNC: 0.3 MG/DL (ref 0–1)
BUN SERPL-MCNC: 16 MG/DL (ref 6–23)
CALCIUM SERPL-MCNC: 9.3 MG/DL (ref 8.3–10.6)
CHLORIDE BLD-SCNC: 105 MMOL/L (ref 99–110)
CO2: 24 MMOL/L (ref 21–32)
CREAT SERPL-MCNC: 1 MG/DL (ref 0.9–1.3)
DIFFERENTIAL TYPE: ABNORMAL
EOSINOPHILS ABSOLUTE: 0.3 K/CU MM
EOSINOPHILS RELATIVE PERCENT: 4.8 % (ref 0–3)
GFR, ESTIMATED: 80 ML/MIN/1.73M2
GLUCOSE SERPL-MCNC: 110 MG/DL (ref 70–99)
HCT VFR BLD CALC: 37.4 % (ref 42–52)
HEMOGLOBIN: 11.8 GM/DL (ref 13.5–18)
LYMPHOCYTES ABSOLUTE: 1.2 K/CU MM
LYMPHOCYTES RELATIVE PERCENT: 17.4 % (ref 24–44)
MCH RBC QN AUTO: 28.7 PG (ref 27–31)
MCHC RBC AUTO-ENTMCNC: 31.6 % (ref 32–36)
MCV RBC AUTO: 91 FL (ref 78–100)
MONOCYTES ABSOLUTE: 0.7 K/CU MM
MONOCYTES RELATIVE PERCENT: 10.4 % (ref 0–4)
NEUTROPHILS ABSOLUTE: 4.4 K/CU MM
NEUTROPHILS RELATIVE PERCENT: 66.9 % (ref 36–66)
PDW BLD-RTO: 17 % (ref 11.7–14.9)
PLATELET # BLD: 222 K/CU MM (ref 140–440)
PMV BLD AUTO: 9.3 FL (ref 7.5–11.1)
POTASSIUM SERPL-SCNC: 4.3 MMOL/L (ref 3.5–5.1)
PSA, ULTRASENSITIVE: 32.91 NG/ML (ref 0–4)
RBC # BLD: 4.11 M/CU MM (ref 4.6–6.2)
SODIUM BLD-SCNC: 143 MMOL/L (ref 135–145)
TOTAL PROTEIN: 6.9 GM/DL (ref 6.4–8.2)
WBC # BLD: 6.6 K/CU MM (ref 4–10.5)

## 2024-07-08 PROCEDURE — 99214 OFFICE O/P EST MOD 30 MIN: CPT | Performed by: INTERNAL MEDICINE

## 2024-07-08 PROCEDURE — G8417 CALC BMI ABV UP PARAM F/U: HCPCS | Performed by: INTERNAL MEDICINE

## 2024-07-08 PROCEDURE — 1123F ACP DISCUSS/DSCN MKR DOCD: CPT | Performed by: INTERNAL MEDICINE

## 2024-07-08 PROCEDURE — 3017F COLORECTAL CA SCREEN DOC REV: CPT | Performed by: INTERNAL MEDICINE

## 2024-07-08 PROCEDURE — 84153 ASSAY OF PSA TOTAL: CPT

## 2024-07-08 PROCEDURE — 1036F TOBACCO NON-USER: CPT | Performed by: INTERNAL MEDICINE

## 2024-07-08 PROCEDURE — 85025 COMPLETE CBC W/AUTO DIFF WBC: CPT

## 2024-07-08 PROCEDURE — 99211 OFF/OP EST MAY X REQ PHY/QHP: CPT

## 2024-07-08 PROCEDURE — 36415 COLL VENOUS BLD VENIPUNCTURE: CPT

## 2024-07-08 PROCEDURE — 80053 COMPREHEN METABOLIC PANEL: CPT

## 2024-07-08 PROCEDURE — G8428 CUR MEDS NOT DOCUMENT: HCPCS | Performed by: INTERNAL MEDICINE

## 2024-07-08 ASSESSMENT — PATIENT HEALTH QUESTIONNAIRE - PHQ9
2. FEELING DOWN, DEPRESSED OR HOPELESS: NOT AT ALL
SUM OF ALL RESPONSES TO PHQ QUESTIONS 1-9: 0
SUM OF ALL RESPONSES TO PHQ QUESTIONS 1-9: 0
SUM OF ALL RESPONSES TO PHQ9 QUESTIONS 1 & 2: 0
1. LITTLE INTEREST OR PLEASURE IN DOING THINGS: NOT AT ALL
SUM OF ALL RESPONSES TO PHQ QUESTIONS 1-9: 0
SUM OF ALL RESPONSES TO PHQ QUESTIONS 1-9: 0

## 2024-07-08 NOTE — PROGRESS NOTES
MA Rooming Questions  Patient: Stoney Morejon  MRN: [id number]    Date: 7/8/2024        1. Do you have any new issues?   No. Patient states left lower abdomen there seems to be something stuck there but when going to look at it nothing is there.     2. Do you need any refills on medications?    yes - Darlolutamide     3. Have you had any imaging done since your last visit?   no    4. Have you been hospitalized or seen in the emergency room since your last visit here?   no    5. Did the patient have a depression screening completed today? Yes    No data recorded     PHQ-9 Given to (if applicable):               PHQ-9 Score (if applicable):                     [] Positive     []  Negative              Does question #9 need addressed (if applicable)                     [] Yes    []  No               Layla Cooper MA      
diagnosis and to release urine outflow obstruction on 10/13/22. Dr. Lenz started him on proscar and casodex on 8/19/22.     He underwent transurethral resection of prostate cancer on 10/13/22 and pathology showed prostate adenocarcinoma, fabi score 4+4=8, (grade group 4, 12/15 cores, involving approximately 30% of tissue present.     Dr. Lenz started ADT with eligard since early November 2022.     Chemotherapy with docetaxel and abiraterone were started on 12/5/22 and he completed it on 3/20/23.     PSMA PET scan on 11/13/23 showed PSMA avid lymph node metastases in the bilateral pelvic and retroperitoneal regions extending superiorly to the level of the lower pole of the kidneys.    He underwent CT guided biopsy of right iliac mass on 12/7/23 and pathology showed adenocarcinoma consistent with prostate primary.     On July 8, 2024, he presented to me for follow-up. I have been following him for prostate cancer.    Clinically, he has castration sensitive prostate cancer, grade group 4 and I reviewed with him all the treatment options.     I recommend ADT with docetaxel and abiraterone. I discussed his case with Dr. Lenz and Eligard was started early November 2022.     Docetaxel and abiraterone were started on 12/5/2022. He completed docetaxel on 3/20/23. He has been on eligard, abiraterone and prednisone since then.     I recognized that his PSA went up to 15.93 ng/ml on 10/20/23 labs. It was 2.29 ng/ml on 7/20/23.     It is concerning for progression of disease and I requested PSMA PET/CT scan. It was done on 11/13/23 and it showed multiple hypermetabolic retroperitoneal and pelvic lymphadenopathy consistent with progression of disease.     His testosterone level today was in castrate level. Since repeat biopsy doesn't show transformation, I discussed with him about further line of therapy.     I recommend him to stop abiraterone. We decided to start second line therapy with darolutamide. It was

## 2024-07-18 ENCOUNTER — TELEPHONE (OUTPATIENT)
Dept: ONCOLOGY | Age: 74
End: 2024-07-18

## 2024-07-18 NOTE — TELEPHONE ENCOUNTER
Patient called given time and prep for PET scan to be done on 8/7/24 [facility] arrival at 1030 AM.

## 2024-08-07 ENCOUNTER — HOSPITAL ENCOUNTER (OUTPATIENT)
Dept: PET IMAGING | Age: 74
Discharge: HOME | End: 2024-08-07
Attending: INTERNAL MEDICINE
Payer: COMMERCIAL

## 2024-08-07 DIAGNOSIS — C79.51 PROSTATE CANCER METASTATIC TO BONE (HCC): ICD-10-CM

## 2024-08-07 DIAGNOSIS — C61 PROSTATE CANCER METASTATIC TO BONE (HCC): ICD-10-CM

## 2024-08-07 PROCEDURE — 78815 PET IMAGE W/CT SKULL-THIGH: CPT

## 2024-08-07 PROCEDURE — 3430000000 HC RX DIAGNOSTIC RADIOPHARMACEUTICAL: Performed by: INTERNAL MEDICINE

## 2024-08-07 PROCEDURE — 2580000003 HC RX 258: Performed by: INTERNAL MEDICINE

## 2024-08-07 PROCEDURE — A9595 HC RX DIAGNOSTIC RADIOPHARMACEUTICAL: HCPCS | Performed by: INTERNAL MEDICINE

## 2024-08-07 RX ORDER — SODIUM CHLORIDE 0.9 % (FLUSH) 0.9 %
10 SYRINGE (ML) INJECTION PRN
Status: COMPLETED | OUTPATIENT
Start: 2024-08-07 | End: 2024-08-07

## 2024-08-07 RX ADMIN — PIFLUFOLASTAT F-18 9.26 MILLICURIE: 80 INJECTION INTRAVENOUS at 10:53

## 2024-08-07 RX ADMIN — SODIUM CHLORIDE, PRESERVATIVE FREE 10 ML: 5 INJECTION INTRAVENOUS at 10:53

## 2024-08-16 ENCOUNTER — HOSPITAL ENCOUNTER (OUTPATIENT)
Dept: INFUSION THERAPY | Age: 74
Discharge: HOME | End: 2024-08-16
Payer: COMMERCIAL

## 2024-08-16 ENCOUNTER — OFFICE VISIT (OUTPATIENT)
Dept: ONCOLOGY | Age: 74
End: 2024-08-16
Payer: COMMERCIAL

## 2024-08-16 VITALS
DIASTOLIC BLOOD PRESSURE: 83 MMHG | HEART RATE: 86 BPM | HEIGHT: 72 IN | TEMPERATURE: 97 F | OXYGEN SATURATION: 94 % | BODY MASS INDEX: 30.07 KG/M2 | SYSTOLIC BLOOD PRESSURE: 143 MMHG | WEIGHT: 222 LBS

## 2024-08-16 DIAGNOSIS — C61 PROSTATE CANCER METASTATIC TO BONE (HCC): ICD-10-CM

## 2024-08-16 DIAGNOSIS — C79.51 PROSTATE CANCER METASTATIC TO BONE (HCC): ICD-10-CM

## 2024-08-16 DIAGNOSIS — C79.51 PROSTATE CANCER METASTATIC TO BONE (HCC): Primary | ICD-10-CM

## 2024-08-16 DIAGNOSIS — C61 PROSTATE CANCER METASTATIC TO BONE (HCC): Primary | ICD-10-CM

## 2024-08-16 LAB
ALBUMIN SERPL-MCNC: 4.2 GM/DL (ref 3.4–5)
ALP BLD-CCNC: 155 IU/L (ref 40–128)
ALT SERPL-CCNC: 8 U/L (ref 10–40)
ANION GAP SERPL CALCULATED.3IONS-SCNC: 12 MMOL/L (ref 7–16)
AST SERPL-CCNC: 12 IU/L (ref 15–37)
BASOPHILS ABSOLUTE: 0.1 K/CU MM
BASOPHILS RELATIVE PERCENT: 1.2 % (ref 0–1)
BILIRUB SERPL-MCNC: 0.6 MG/DL (ref 0–1)
BUN SERPL-MCNC: 16 MG/DL (ref 6–23)
CALCIUM SERPL-MCNC: 9.5 MG/DL (ref 8.3–10.6)
CHLORIDE BLD-SCNC: 102 MMOL/L (ref 99–110)
CO2: 26 MMOL/L (ref 21–32)
CREAT SERPL-MCNC: 1 MG/DL (ref 0.9–1.3)
DIFFERENTIAL TYPE: ABNORMAL
EOSINOPHILS ABSOLUTE: 0.1 K/CU MM
EOSINOPHILS RELATIVE PERCENT: 2.5 % (ref 0–3)
GFR, ESTIMATED: 80 ML/MIN/1.73M2
GLUCOSE SERPL-MCNC: 110 MG/DL (ref 70–99)
HCT VFR BLD CALC: 36 % (ref 42–52)
HEMOGLOBIN: 11.6 GM/DL (ref 13.5–18)
LYMPHOCYTES ABSOLUTE: 1.2 K/CU MM
LYMPHOCYTES RELATIVE PERCENT: 23.8 % (ref 24–44)
MCH RBC QN AUTO: 29.4 PG (ref 27–31)
MCHC RBC AUTO-ENTMCNC: 32.2 % (ref 32–36)
MCV RBC AUTO: 91.1 FL (ref 78–100)
MONOCYTES ABSOLUTE: 0.5 K/CU MM
MONOCYTES RELATIVE PERCENT: 9.8 % (ref 0–4)
NEUTROPHILS ABSOLUTE: 3.2 K/CU MM
NEUTROPHILS RELATIVE PERCENT: 62.7 % (ref 36–66)
PDW BLD-RTO: 17 % (ref 11.7–14.9)
PLATELET # BLD: 180 K/CU MM (ref 140–440)
PMV BLD AUTO: 9.7 FL (ref 7.5–11.1)
POTASSIUM SERPL-SCNC: 4.5 MMOL/L (ref 3.5–5.1)
PSA, ULTRASENSITIVE: 27.61 NG/ML (ref 0–4)
RBC # BLD: 3.95 M/CU MM (ref 4.6–6.2)
SODIUM BLD-SCNC: 140 MMOL/L (ref 135–145)
TOTAL PROTEIN: 7 GM/DL (ref 6.4–8.2)
WBC # BLD: 5.1 K/CU MM (ref 4–10.5)

## 2024-08-16 PROCEDURE — 36415 COLL VENOUS BLD VENIPUNCTURE: CPT

## 2024-08-16 PROCEDURE — G8417 CALC BMI ABV UP PARAM F/U: HCPCS | Performed by: INTERNAL MEDICINE

## 2024-08-16 PROCEDURE — 1036F TOBACCO NON-USER: CPT | Performed by: INTERNAL MEDICINE

## 2024-08-16 PROCEDURE — 3017F COLORECTAL CA SCREEN DOC REV: CPT | Performed by: INTERNAL MEDICINE

## 2024-08-16 PROCEDURE — G8427 DOCREV CUR MEDS BY ELIG CLIN: HCPCS | Performed by: INTERNAL MEDICINE

## 2024-08-16 PROCEDURE — 99211 OFF/OP EST MAY X REQ PHY/QHP: CPT

## 2024-08-16 PROCEDURE — 1123F ACP DISCUSS/DSCN MKR DOCD: CPT | Performed by: INTERNAL MEDICINE

## 2024-08-16 PROCEDURE — 85025 COMPLETE CBC W/AUTO DIFF WBC: CPT

## 2024-08-16 PROCEDURE — 99214 OFFICE O/P EST MOD 30 MIN: CPT | Performed by: INTERNAL MEDICINE

## 2024-08-16 PROCEDURE — 84153 ASSAY OF PSA TOTAL: CPT

## 2024-08-16 PROCEDURE — 80053 COMPREHEN METABOLIC PANEL: CPT

## 2024-08-16 NOTE — PROGRESS NOTES
MA Rooming Questions  Patient: Stoney Morejon  MRN: [id number]    Date: 8/16/2024        1. Do you have any new issues?   no         2. Do you need any refills on medications?    no    3. Have you had any imaging done since your last visit?   Yes - PET scan 8/7    4. Have you been hospitalized or seen in the emergency room since your last visit here?   no    5. Did the patient have a depression screening completed today? No    No data recorded     PHQ-9 Given to (if applicable):               PHQ-9 Score (if applicable):                     [] Positive     []  Negative              Does question #9 need addressed (if applicable)                     [] Yes    []  No               Alea Jenkins CMA

## 2024-08-16 NOTE — PROGRESS NOTES
Patient Name:  Stoney Morejon  Patient :  1950  Patient MRN:  [id number]     Primary Oncologist: Rolf Parr MD  Referring Provider: No primary care provider on file.     Date of Service: 2024     Chief Complaint:    Chief Complaint   Patient presents with    Follow-up    Results     PSMA PET scan     Patient's active problem list:        Metastatic prostate cancer    HPI:   Stoney Morejon is a 74-year-old very pleasant gentleman with medical history significant for hypertension, hyperlipidemia, COPD, GERD, osteoarthritis and history of kidney stone, initially referred to me on 2022 for evaluation of metastatic prostate cancer.     He initially presented to [facility] on 22 with abdominal pain and PVR was about 9 liter. CT scan on 22 showed cystitis, b/l moderate to severe HN, b/l renal stones.     All the renal stones are non obstructive and his ARF resolved with koch catheter.     He was also noted to have significant elevation in PSA (331 ng/ml on 22). It was 150 on 2022 and 5 on 2019. RACHEL at that time showed rock hard large prostate.     MRI pelvis showed diffuse tumor involvement of the prostate gland, with loss of zonal anatomy, extraprostatic extension and involvement of the seminal vesicles and suspected involvement of the neurovascular bundles, PI-RADS 5.  Enlarged pelvic and retroperitoneal lymph nodes consistent with willow metastasis.  A larger right common iliac chain lymph node results in mass-effect on the right common iliac vein.  Early mucosa enhancement of the urinary bladder.  Differential includes a cystitis although involvement of the urinary bladder is not excluded given extensive involvement of the prostate gland.    He has right lower extremity swelling. Doppler study was negative for DVT and Echo showed normal EF. RLE swelling is most likely due to mass effect from enlarged lymph node to right common iliac vein.     Bone scan done on 2022 showed findings

## 2024-08-19 ENCOUNTER — CLINICAL DOCUMENTATION (OUTPATIENT)
Dept: ONCOLOGY | Age: 74
End: 2024-08-19

## 2024-08-19 NOTE — PROGRESS NOTES
This nurse called the patient @ [phone] to review lab results. Patient was notified of his PSA being 27 and that is an indication his current treatment is controlling his metastatic disease. Patient verbalized understanding and denies further needs at this time.

## 2024-08-26 RX ORDER — FERROUS SULFATE 325(65) MG
1 TABLET ORAL
Qty: 90 TABLET | Refills: 1 | Status: SHIPPED | OUTPATIENT
Start: 2024-08-26

## 2024-10-11 ENCOUNTER — HOSPITAL ENCOUNTER (OUTPATIENT)
Dept: INFUSION THERAPY | Age: 74
Discharge: HOME | End: 2024-10-11
Payer: COMMERCIAL

## 2024-10-11 DIAGNOSIS — C61 PROSTATE CANCER METASTATIC TO BONE (HCC): ICD-10-CM

## 2024-10-11 DIAGNOSIS — C79.51 PROSTATE CANCER METASTATIC TO BONE (HCC): Primary | ICD-10-CM

## 2024-10-11 DIAGNOSIS — C61 PROSTATE CANCER METASTATIC TO BONE (HCC): Primary | ICD-10-CM

## 2024-10-11 DIAGNOSIS — C79.51 PROSTATE CANCER METASTATIC TO BONE (HCC): ICD-10-CM

## 2024-10-11 LAB
ALBUMIN SERPL-MCNC: 4 G/DL (ref 3.4–5)
ALBUMIN/GLOB SERPL: 1.8 {RATIO} (ref 1.1–2.2)
ALP SERPL-CCNC: 146 U/L (ref 40–129)
ALT SERPL-CCNC: 11 U/L (ref 10–40)
ANION GAP SERPL CALCULATED.3IONS-SCNC: 10 MMOL/L (ref 9–17)
AST SERPL-CCNC: 14 U/L (ref 15–37)
BASOPHILS # BLD: 0.05 K/UL
BASOPHILS NFR BLD: 1 % (ref 0–1)
BILIRUB SERPL-MCNC: 0.5 MG/DL (ref 0–1)
BUN SERPL-MCNC: 23 MG/DL (ref 7–20)
CALCIUM SERPL-MCNC: 9.5 MG/DL (ref 8.3–10.6)
CHLORIDE SERPL-SCNC: 105 MMOL/L (ref 99–110)
CO2 SERPL-SCNC: 27 MMOL/L (ref 21–32)
CREAT SERPL-MCNC: 1.2 MG/DL (ref 0.8–1.3)
EOSINOPHIL # BLD: 0.14 K/UL
EOSINOPHILS RELATIVE PERCENT: 3 % (ref 0–3)
ERYTHROCYTE [DISTWIDTH] IN BLOOD BY AUTOMATED COUNT: 19.3 % (ref 11.7–14.9)
GFR, ESTIMATED: 59 ML/MIN/1.73M2
GLUCOSE SERPL-MCNC: 107 MG/DL (ref 74–99)
HCT VFR BLD AUTO: 37.9 % (ref 42–52)
HGB BLD-MCNC: 12.2 G/DL (ref 13.5–18)
LYMPHOCYTES NFR BLD: 1.17 K/UL
LYMPHOCYTES RELATIVE PERCENT: 23 % (ref 24–44)
MCH RBC QN AUTO: 30.1 PG (ref 27–31)
MCHC RBC AUTO-ENTMCNC: 32.2 G/DL (ref 32–36)
MCV RBC AUTO: 93.6 FL (ref 78–100)
MONOCYTES NFR BLD: 0.68 K/UL
MONOCYTES NFR BLD: 14 % (ref 0–4)
NEUTROPHILS NFR BLD: 59 % (ref 36–66)
NEUTS SEG NFR BLD: 2.96 K/UL
PLATELET # BLD AUTO: 199 K/UL (ref 140–440)
PMV BLD AUTO: 9.6 FL (ref 7.5–11.1)
POTASSIUM SERPL-SCNC: 4 MMOL/L (ref 3.5–5.1)
PROT SERPL-MCNC: 6.2 G/DL (ref 6.4–8.2)
PSA SERPL-MCNC: 31.4 NG/ML (ref 0–4)
RBC # BLD AUTO: 4.05 M/UL (ref 4.6–6.2)
SODIUM SERPL-SCNC: 142 MMOL/L (ref 136–145)
WBC OTHER # BLD: 5 K/UL (ref 4–10.5)

## 2024-10-11 PROCEDURE — 84153 ASSAY OF PSA TOTAL: CPT

## 2024-10-11 PROCEDURE — 36415 COLL VENOUS BLD VENIPUNCTURE: CPT

## 2024-10-11 PROCEDURE — 80053 COMPREHEN METABOLIC PANEL: CPT

## 2024-10-11 PROCEDURE — 85025 COMPLETE CBC W/AUTO DIFF WBC: CPT

## 2024-10-18 ENCOUNTER — HOSPITAL ENCOUNTER (OUTPATIENT)
Dept: INFUSION THERAPY | Age: 74
Discharge: HOME | End: 2024-10-18
Payer: COMMERCIAL

## 2024-10-18 ENCOUNTER — OFFICE VISIT (OUTPATIENT)
Dept: ONCOLOGY | Age: 74
End: 2024-10-18
Payer: COMMERCIAL

## 2024-10-18 VITALS
SYSTOLIC BLOOD PRESSURE: 140 MMHG | HEIGHT: 72 IN | TEMPERATURE: 97.3 F | BODY MASS INDEX: 29.12 KG/M2 | WEIGHT: 215 LBS | HEART RATE: 96 BPM | DIASTOLIC BLOOD PRESSURE: 69 MMHG | OXYGEN SATURATION: 94 %

## 2024-10-18 DIAGNOSIS — C79.51 PROSTATE CANCER METASTATIC TO BONE (HCC): Primary | ICD-10-CM

## 2024-10-18 DIAGNOSIS — C61 PROSTATE CANCER METASTATIC TO BONE (HCC): Primary | ICD-10-CM

## 2024-10-18 PROCEDURE — G8428 CUR MEDS NOT DOCUMENT: HCPCS | Performed by: INTERNAL MEDICINE

## 2024-10-18 PROCEDURE — 1036F TOBACCO NON-USER: CPT | Performed by: INTERNAL MEDICINE

## 2024-10-18 PROCEDURE — 99211 OFF/OP EST MAY X REQ PHY/QHP: CPT

## 2024-10-18 PROCEDURE — 3017F COLORECTAL CA SCREEN DOC REV: CPT | Performed by: INTERNAL MEDICINE

## 2024-10-18 PROCEDURE — 99214 OFFICE O/P EST MOD 30 MIN: CPT | Performed by: INTERNAL MEDICINE

## 2024-10-18 PROCEDURE — G8417 CALC BMI ABV UP PARAM F/U: HCPCS | Performed by: INTERNAL MEDICINE

## 2024-10-18 PROCEDURE — 1123F ACP DISCUSS/DSCN MKR DOCD: CPT | Performed by: INTERNAL MEDICINE

## 2024-10-18 PROCEDURE — G8484 FLU IMMUNIZE NO ADMIN: HCPCS | Performed by: INTERNAL MEDICINE

## 2024-10-18 RX ORDER — FERROUS SULFATE 325(65) MG
1 TABLET ORAL
Qty: 90 TABLET | Refills: 1 | Status: SHIPPED | OUTPATIENT
Start: 2024-10-18

## 2024-10-18 NOTE — PROGRESS NOTES
MA Rooming Questions  Patient: Stoney Morejon  MRN: [id number]    Date: 10/18/2024        1. Do you have any new issues?   no         2. Do you need any refills on medications?    Yes - Iron    3. Have you had any imaging done since your last visit?   no    4. Have you been hospitalized or seen in the emergency room since your last visit here?   no    5. Did the patient have a depression screening completed today? No    No data recorded     PHQ-9 Given to (if applicable):               PHQ-9 Score (if applicable):                     [] Positive     []  Negative              Does question #9 need addressed (if applicable)                     [] Yes    []  No               Alea Jenkins CMA      
(L) 10/11/2024    HGB 12.2 (L) 10/11/2024    HCT 37.9 (L) 10/11/2024    MCV 93.6 10/11/2024    MCH 30.1 10/11/2024    MCHC 32.2 10/11/2024    RDW 19.3 (H) 10/11/2024     10/11/2024    MPV 9.6 10/11/2024    BASOPCT 1 10/11/2024    LYMPHOPCT 23 (L) 10/11/2024    MONOPCT 14 (H) 10/11/2024    EOSABS 0.14 10/11/2024    BASOSABS 0.05 10/11/2024    LYMPHSABS 1.17 10/11/2024    MONOSABS 0.68 10/11/2024    DIFFTYPE AUTOMATED DIFFERENTIAL 08/16/2024     No results found for: \"ESR\"  Chemistry:  Lab Results   Component Value Date     10/11/2024    K 4.0 10/11/2024     10/11/2024    CO2 27 10/11/2024    BUN 23 (H) 10/11/2024    CREATININE 1.2 10/11/2024    GLUCOSE 107 (H) 10/11/2024    CALCIUM 9.5 10/11/2024    BILITOT 0.5 10/11/2024    ALKPHOS 146 (H) 10/11/2024    AST 14 (L) 10/11/2024    ALT 11 10/11/2024    LABGLOM 59 (L) 10/11/2024     Lab Results   Component Value Date     03/06/2024     No results found for: \"LD\"  No results found for: \"TSHHS\", \"T4FREE\", \"FT3\"  Immunology:  No results found for: \"SPEP\", \"ALBUMINELP\", \"LABALPH\", \"LABBETA\", \"GAMGLOB\"    No results found for: \"KAPPAUVOL\", \"LAMBDAUVOL\", \"KLFLCR\"  No results found for: \"B2M\"  Coagulation Panel:  Lab Results   Component Value Date    PROTIME 13.7 12/07/2023    INR 1.0 12/07/2023    APTT 28.7 12/07/2023     Anemia Panel:  Lab Results   Component Value Date    ZFMDLVJX80 332.7 03/06/2024    FOLATE 17.5 03/06/2024     Tumor Markers:  Lab Results   Component Value Date    PSA 31.40 (H) 10/11/2024      Observations:  No data recorded    Assessment:  Castration naive metastatic prostate cancer    Plan:  Stoney Morejon is a 72-year-old very pleasant gentleman who initially presented to [facility] with urinary outlet obstruction.     Further work up showed that he has rock hard large prostate on RACHEL with significantly elevated PSA (331).     MRI showed multiple abdominal and pelvic lymphadenopathy. One of the right pelvic lymph node has mass effect

## 2024-10-31 RX ORDER — DAROLUTAMIDE 300 MG/1
TABLET, FILM COATED ORAL
Qty: 120 TABLET | Refills: 3 | Status: ACTIVE | OUTPATIENT
Start: 2024-10-31

## 2024-10-31 NOTE — TELEPHONE ENCOUNTER
Patient left message requesting a refill for Nubeqa  to be sent to [facility]. Pending RX to Provider to be sent to pharmacy.

## 2024-11-29 DIAGNOSIS — C61 PROSTATE CANCER METASTATIC TO BONE (HCC): ICD-10-CM

## 2024-11-29 DIAGNOSIS — C79.51 PROSTATE CANCER METASTATIC TO BONE (HCC): ICD-10-CM

## 2024-12-02 RX ORDER — TALAZOPARIB 0.5 MG/1
CAPSULE, LIQUID FILLED ORAL
Qty: 30 CAPSULE | Refills: 5 | Status: ACTIVE | OUTPATIENT
Start: 2024-12-02

## 2024-12-11 ENCOUNTER — HOSPITAL ENCOUNTER (OUTPATIENT)
Dept: INFUSION THERAPY | Age: 74
Discharge: HOME | End: 2024-12-11
Payer: COMMERCIAL

## 2024-12-11 ENCOUNTER — OFFICE VISIT (OUTPATIENT)
Dept: ONCOLOGY | Age: 74
End: 2024-12-11
Payer: COMMERCIAL

## 2024-12-11 VITALS
TEMPERATURE: 97.7 F | HEIGHT: 72 IN | BODY MASS INDEX: 31.13 KG/M2 | OXYGEN SATURATION: 97 % | RESPIRATION RATE: 16 BRPM | WEIGHT: 229.8 LBS | DIASTOLIC BLOOD PRESSURE: 81 MMHG | HEART RATE: 88 BPM | SYSTOLIC BLOOD PRESSURE: 156 MMHG

## 2024-12-11 DIAGNOSIS — C79.51 PROSTATE CANCER METASTATIC TO BONE (HCC): Primary | ICD-10-CM

## 2024-12-11 DIAGNOSIS — C61 PROSTATE CANCER METASTATIC TO BONE (HCC): Primary | ICD-10-CM

## 2024-12-11 PROCEDURE — 1126F AMNT PAIN NOTED NONE PRSNT: CPT | Performed by: INTERNAL MEDICINE

## 2024-12-11 PROCEDURE — 1159F MED LIST DOCD IN RCRD: CPT | Performed by: INTERNAL MEDICINE

## 2024-12-11 PROCEDURE — 1036F TOBACCO NON-USER: CPT | Performed by: INTERNAL MEDICINE

## 2024-12-11 PROCEDURE — 3017F COLORECTAL CA SCREEN DOC REV: CPT | Performed by: INTERNAL MEDICINE

## 2024-12-11 PROCEDURE — 99211 OFF/OP EST MAY X REQ PHY/QHP: CPT

## 2024-12-11 PROCEDURE — G8427 DOCREV CUR MEDS BY ELIG CLIN: HCPCS | Performed by: INTERNAL MEDICINE

## 2024-12-11 PROCEDURE — 99214 OFFICE O/P EST MOD 30 MIN: CPT | Performed by: INTERNAL MEDICINE

## 2024-12-11 PROCEDURE — G8417 CALC BMI ABV UP PARAM F/U: HCPCS | Performed by: INTERNAL MEDICINE

## 2024-12-11 PROCEDURE — 1123F ACP DISCUSS/DSCN MKR DOCD: CPT | Performed by: INTERNAL MEDICINE

## 2024-12-11 PROCEDURE — G8484 FLU IMMUNIZE NO ADMIN: HCPCS | Performed by: INTERNAL MEDICINE

## 2024-12-11 RX ORDER — UMECLIDINIUM BROMIDE AND VILANTEROL TRIFENATATE 62.5; 25 UG/1; UG/1
POWDER RESPIRATORY (INHALATION)
COMMUNITY
Start: 2024-12-03

## 2024-12-11 RX ORDER — FAMOTIDINE 40 MG/1
40 TABLET, FILM COATED ORAL 2 TIMES DAILY
COMMUNITY
Start: 2024-11-20

## 2024-12-11 ASSESSMENT — PATIENT HEALTH QUESTIONNAIRE - PHQ9
SUM OF ALL RESPONSES TO PHQ QUESTIONS 1-9: 0
1. LITTLE INTEREST OR PLEASURE IN DOING THINGS: NOT AT ALL
SUM OF ALL RESPONSES TO PHQ9 QUESTIONS 1 & 2: 0
SUM OF ALL RESPONSES TO PHQ QUESTIONS 1-9: 0
2. FEELING DOWN, DEPRESSED OR HOPELESS: NOT AT ALL

## 2024-12-11 NOTE — PROGRESS NOTES
CC:  Chief Complaint   Patient presents with    Dizziness     Started last month    Neurologic Problem     Patient stated she has problem walking straight and trembling          HPI:    Pt is here for a follow up.    Pt has been experiencing an unsteady gait for about a month. She feels shaky. She also feels tired all the time. Upon further question, patient is under a lot of stress right now. She is still mourning the loss of her  and appears to be having financial problems. Pt feels depressed but does not want to share with anyone her burdens except her therapist. Her therapist did recommend increasing Zoloft dose to 100 mg daily.                   PMH:    Medications, Allergies, Past Medical, Surgical,Family, and Social history reviewed    Current Outpatient Medications   Medication Sig Dispense Refill    hydroCHLOROthiazide (HYDRODIURIL) 25 MG tablet Take 1 tablet by mouth daily. 30 tablet 1    levothyroxine 50 MCG tablet Take 1 tablet by mouth daily. 90 tablet 1    Diclofenac Sodium 1 % Cream Apply 1 application. topically 4 times daily as needed (as needed for pain and swelling). 120 g 0    sertraline (ZOLOFT) 100 MG tablet Take 1 tablet by mouth every morning. 30 tablet 3    sertraline (ZOLOFT) 25 MG tablet Take 1 tablet by mouth every morning. 30 tablet 3    fluticasone (FLONASE) 50 MCG/ACT nasal spray SHAKE LIQUID AND USE 2 SPRAYS IN EACH NOSTRIL DAILY 48 g 0    lactulose (CHRONULAC) 10 GM/15ML solution TAKE 20 ML BY MOUTH TWICE DAILY      benzonatate (TESSALON PERLES) 100 MG capsule Take 1 capsule by mouth 3 times daily as needed for Cough. 20 capsule 0    telmisartan-hydrochlorothiazide (MICARDIS HCT) 80-12.5 MG per tablet TAKE 1 TABLET BY MOUTH DAILY 90 tablet 2    naproxen (NAPROSYN) 500 MG tablet Take 1 tablet by mouth in the morning and 1 tablet in the evening. Take with meals. 60 tablet 0    DULoxetine (CYMBALTA) 60 MG capsule Take 1 capsule by mouth daily. 90 capsule 3    fluticasone 
MA Rooming Questions  Patient: Stoney Morejon  MRN: [id number]    Date: 12/11/2024        1. Do you have any new issues?   no         2. Do you need any refills on medications?    no    3. Have you had any imaging done since your last visit?   no    4. Have you been hospitalized or seen in the emergency room since your last visit here?   no    5. Did the patient have a depression screening completed today? Yes    No data recorded     PHQ-9 Given to (if applicable):               PHQ-9 Score (if applicable):                     [] Positive     []  Negative              Does question #9 need addressed (if applicable)                     [] Yes    []  No               Nikki Kang CMA    
(FLONASE) 50 MCG/ACT nasal spray SHAKE LIQUID AND USE 2 SPRAYS IN EACH NOSTRIL ONCE DAILY 16 g 3    diclofenac (VOLTAREN) 0.1 % ophthalmic solution       Flovent  MCG/ACT inhaler Inhale 2 puffs into the lungs 2 times daily as needed (Shortness of Breath, wheezing). 12 g 11    azelastine (ASTELIN) 0.1 % nasal spray U 1 TO 2 SPRAYS IEN BID PRN       No current facility-administered medications for this visit.       ALLERGIES:   Allergen Reactions    Aspirin Other (See Comments)     Unknown    Iodine   (Environmental Or Med) Other (See Comments)     Unknown    Penicillins Other (See Comments)     Unknown    Latex Other (See Comments), HIVES and PRURITUS     Unknown  LATEX gloves  Unknown  LATEX gloves  Unknown    Phenazopyridine Other (See Comments)     Unknown  Unknown  Unknown    Sulfa Antibiotics Other (See Comments)     Unknown       Past Medical History:   Diagnosis Date    Depressive disorder     Essential (primary) hypertension     Lumbar radiculopathy 2019    Thyroid disease        Past Surgical History:   Procedure Laterality Date    Appendectomy       section, classic      Cystoscopy         Family History   Problem Relation Age of Onset    Diabetes Mother     Hypertension Mother     Coronary Artery Disease Father     Diabetes Father     Stroke Father        Social History     Tobacco Use    Smoking status: Never     Passive exposure: Never    Smokeless tobacco: Never   Vaping Use    Vaping Use: never used   Substance Use Topics    Alcohol use: No    Drug use: Never           ROS:    ROS as per HPI          Physical Examination:  Vitals:Reviewed, Visit Vitals  /62 (BP Location: LUE - Left upper extremity, Patient Position: Sitting, Cuff Size: Large Adult)   Pulse 72   Temp 97.6 °F (36.4 °C) (Temporal)   Resp 15   Ht 5' 4.5\" (1.638 m)   Wt 66.7 kg (147 lb 2.5 oz)   SpO2 96%   BMI 24.87 kg/m²     General: AAO and NAD  HEENT:Atraumatic, normal facies, no eyelid swelling, normal sclera, 
MMM,  Lymph: No LAD  Cardiac: RRR, no murmurs, no edema  Respiratory: No respiratory distress, Lungs CTA B/L  Abdomen: Soft, NT/ND  : Deferred   Ext: No edema, no joint swelling, ROM WNL  Neurologic: CN Intact, MS 5/5, gait appropriate, no cerebellar abnormalities, negative Romberg, reflexes wnl   Dermatologic: No Rashes or lesions, normal skin pigmentation  Psychiatric: Appropriate        Labs/Imaging:  Independently Reviewed         Assessment/Plan:    Problem List Items Addressed This Visit          Cardiac and Vasculature    Essential hypertension     Controlled            Mental Health    Depression     Pt currently going to see a therapist and is on SSRI  Discussed potential things to do to improve her depression  F/u 4 weeks             Symptoms and Signs    Gait instability - Primary    Relevant Orders    SERVICE TO NEUROLOGY         Total face to face time spent with patient >40 minutes. Greater than 50% of the total time was spent counseling and/or coordinating care.    
abdominal and pelvic lymphadenopathy. One of the right pelvic lymph node has mass effect of right common iliac vein and causing him significant right leg swelling. Bone scan showed metastatic disease in T10 vertebra body.     He is scheduled for TURP to get tissue diagnosis and to release urine outflow obstruction on 10/13/22. Dr. Lenz started him on proscar and casodex on 8/19/22.     He underwent transurethral resection of prostate cancer on 10/13/22 and pathology showed prostate adenocarcinoma, fabi score 4+4=8, (grade group 4, 12/15 cores, involving approximately 30% of tissue present.     Dr. Lenz started ADT with eligard since early November 2022.     Chemotherapy with docetaxel and abiraterone were started on 12/5/22 and he completed it on 3/20/23.     PSMA PET scan on 11/13/23 showed PSMA avid lymph node metastases in the bilateral pelvic and retroperitoneal regions extending superiorly to the level of the lower pole of the kidneys.    He underwent CT guided biopsy of right iliac mass on 12/7/23 and pathology showed adenocarcinoma consistent with prostate primary.     On December 11, 2024, he presented to me for follow-up. I have been following him for prostate cancer.    Clinically, he has castration sensitive prostate cancer, grade group 4 and I reviewed with him all the treatment options.     I recommend ADT with docetaxel and abiraterone. I discussed his case with Dr. Lenz and Eligard was started early November 2022.     Docetaxel and abiraterone were started on 12/5/2022. He completed docetaxel on 3/20/23. He has been on eligard, abiraterone and prednisone since then.     I recognized that his PSA went up to 15.93 ng/ml on 10/20/23 labs. It was 2.29 ng/ml on 7/20/23.     It is concerning for progression of disease and I requested PSMA PET/CT scan. It was done on 11/13/23 and it showed multiple hypermetabolic retroperitoneal and pelvic lymphadenopathy consistent with progression of

## 2025-02-05 ENCOUNTER — TELEPHONE (OUTPATIENT)
Dept: ONCOLOGY | Age: 75
End: 2025-02-05

## 2025-02-05 NOTE — TELEPHONE ENCOUNTER
Called patient to reschedule their No Show appointment on 2/4 @ 9:00 with . Spoke with patient and rescheduled appointment to 3/5/2025@9:45 with .

## 2025-03-05 ENCOUNTER — OFFICE VISIT (OUTPATIENT)
Dept: ONCOLOGY | Age: 75
End: 2025-03-05
Payer: COMMERCIAL

## 2025-03-05 ENCOUNTER — HOSPITAL ENCOUNTER (OUTPATIENT)
Dept: INFUSION THERAPY | Age: 75
Discharge: HOME | End: 2025-03-05
Payer: COMMERCIAL

## 2025-03-05 VITALS
TEMPERATURE: 97.6 F | DIASTOLIC BLOOD PRESSURE: 79 MMHG | HEART RATE: 86 BPM | HEIGHT: 73 IN | WEIGHT: 230 LBS | OXYGEN SATURATION: 97 % | BODY MASS INDEX: 30.48 KG/M2 | SYSTOLIC BLOOD PRESSURE: 124 MMHG

## 2025-03-05 DIAGNOSIS — C79.51 PROSTATE CANCER METASTATIC TO BONE (HCC): Primary | ICD-10-CM

## 2025-03-05 DIAGNOSIS — C61 PROSTATE CANCER METASTATIC TO BONE (HCC): Primary | ICD-10-CM

## 2025-03-05 DIAGNOSIS — C61 PROSTATE CANCER METASTATIC TO BONE (HCC): ICD-10-CM

## 2025-03-05 DIAGNOSIS — C79.51 PROSTATE CANCER METASTATIC TO BONE (HCC): ICD-10-CM

## 2025-03-05 LAB
ALBUMIN SERPL-MCNC: 3.6 G/DL (ref 3.4–5)
ALBUMIN/GLOB SERPL: 1.4 {RATIO} (ref 1.1–2.2)
ALP SERPL-CCNC: 145 U/L (ref 40–129)
ALT SERPL-CCNC: 7 U/L (ref 10–40)
ANION GAP SERPL CALCULATED.3IONS-SCNC: 11 MMOL/L (ref 9–17)
AST SERPL-CCNC: 14 U/L (ref 15–37)
BASOPHILS # BLD: 0.03 K/UL
BASOPHILS NFR BLD: 1 % (ref 0–1)
BILIRUB SERPL-MCNC: 0.4 MG/DL (ref 0–1)
BUN SERPL-MCNC: 17 MG/DL (ref 7–20)
CALCIUM SERPL-MCNC: 9.1 MG/DL (ref 8.3–10.6)
CHLORIDE SERPL-SCNC: 109 MMOL/L (ref 99–110)
CO2 SERPL-SCNC: 24 MMOL/L (ref 21–32)
CREAT SERPL-MCNC: 1.1 MG/DL (ref 0.8–1.3)
EOSINOPHIL # BLD: 0.23 K/UL
EOSINOPHILS RELATIVE PERCENT: 5 % (ref 0–3)
ERYTHROCYTE [DISTWIDTH] IN BLOOD BY AUTOMATED COUNT: 15.5 % (ref 11.7–14.9)
FERRITIN SERPL-MCNC: 91 NG/ML (ref 30–400)
GFR, ESTIMATED: 68 ML/MIN/1.73M2
GLUCOSE SERPL-MCNC: 110 MG/DL (ref 74–99)
HCT VFR BLD AUTO: 31.8 % (ref 42–52)
HGB BLD-MCNC: 10.1 G/DL (ref 13.5–18)
IRON SATN MFR SERPL: 27 % (ref 15–50)
IRON SERPL-MCNC: 72 UG/DL (ref 59–158)
LYMPHOCYTES NFR BLD: 0.84 K/UL
LYMPHOCYTES RELATIVE PERCENT: 19 % (ref 24–44)
MCH RBC QN AUTO: 31.4 PG (ref 27–31)
MCHC RBC AUTO-ENTMCNC: 31.8 G/DL (ref 32–36)
MCV RBC AUTO: 98.8 FL (ref 78–100)
MONOCYTES NFR BLD: 0.45 K/UL
MONOCYTES NFR BLD: 10 % (ref 0–4)
NEUTROPHILS NFR BLD: 66 % (ref 36–66)
NEUTS SEG NFR BLD: 3 K/UL
PLATELET # BLD AUTO: 102 K/UL (ref 140–440)
PMV BLD AUTO: 9.8 FL (ref 7.5–11.1)
POTASSIUM SERPL-SCNC: 4.5 MMOL/L (ref 3.5–5.1)
PROT SERPL-MCNC: 6.1 G/DL (ref 6.4–8.2)
PSA SERPL-MCNC: 61.6 NG/ML (ref 0–4)
RBC # BLD AUTO: 3.22 M/UL (ref 4.6–6.2)
SODIUM SERPL-SCNC: 144 MMOL/L (ref 136–145)
TIBC SERPL-MCNC: 266 UG/DL (ref 260–445)
UNSATURATED IRON BINDING CAPACITY: 194 UG/DL (ref 110–370)
WBC OTHER # BLD: 4.6 K/UL (ref 4–10.5)

## 2025-03-05 PROCEDURE — 83540 ASSAY OF IRON: CPT

## 2025-03-05 PROCEDURE — G8427 DOCREV CUR MEDS BY ELIG CLIN: HCPCS | Performed by: INTERNAL MEDICINE

## 2025-03-05 PROCEDURE — 99214 OFFICE O/P EST MOD 30 MIN: CPT | Performed by: INTERNAL MEDICINE

## 2025-03-05 PROCEDURE — 36415 COLL VENOUS BLD VENIPUNCTURE: CPT

## 2025-03-05 PROCEDURE — 85025 COMPLETE CBC W/AUTO DIFF WBC: CPT

## 2025-03-05 PROCEDURE — 82728 ASSAY OF FERRITIN: CPT

## 2025-03-05 PROCEDURE — 1036F TOBACCO NON-USER: CPT | Performed by: INTERNAL MEDICINE

## 2025-03-05 PROCEDURE — 3017F COLORECTAL CA SCREEN DOC REV: CPT | Performed by: INTERNAL MEDICINE

## 2025-03-05 PROCEDURE — 99212 OFFICE O/P EST SF 10 MIN: CPT

## 2025-03-05 PROCEDURE — 1126F AMNT PAIN NOTED NONE PRSNT: CPT | Performed by: INTERNAL MEDICINE

## 2025-03-05 PROCEDURE — G8417 CALC BMI ABV UP PARAM F/U: HCPCS | Performed by: INTERNAL MEDICINE

## 2025-03-05 PROCEDURE — 1123F ACP DISCUSS/DSCN MKR DOCD: CPT | Performed by: INTERNAL MEDICINE

## 2025-03-05 PROCEDURE — 83550 IRON BINDING TEST: CPT

## 2025-03-05 PROCEDURE — 1159F MED LIST DOCD IN RCRD: CPT | Performed by: INTERNAL MEDICINE

## 2025-03-05 PROCEDURE — 80053 COMPREHEN METABOLIC PANEL: CPT

## 2025-03-05 PROCEDURE — 84153 ASSAY OF PSA TOTAL: CPT

## 2025-03-05 RX ORDER — FERROUS SULFATE 325(65) MG
1 TABLET ORAL
Qty: 90 TABLET | Refills: 1 | Status: SHIPPED | OUTPATIENT
Start: 2025-03-05

## 2025-03-05 NOTE — PROGRESS NOTES
MA Rooming Questions  Patient: Stoney Morejon  MRN: [id number]    Date: 3/5/2025        1. Do you have any new issues?   no         2. Do you need any refills on medications?    no    3. Have you had any imaging done since your last visit?   no    4. Have you been hospitalized or seen in the emergency room since your last visit here?   no    5. Did the patient have a depression screening completed today? No    No data recorded     PHQ-9 Given to (if applicable):               PHQ-9 Score (if applicable):                     [] Positive     []  Negative              Does question #9 need addressed (if applicable)                     [] Yes    []  No               Carline Dickerson MA      
approximately 45 years.  He denies alcohol drinking or illicit drug abuse.    Family History:    Significant for COPD and CHF in his mother.    No Known Allergies    Review of Systems:  \"Per interval history; otherwise 10 point ROS is negative.\"  His energy level is decent today and his sleep is fine. He doesn't have fever, chills, night sweats, cough, shortness of breath, chest pain, hemoptysis or palpitations. His bowel and bladder functions are normal. He denies nausea, vomiting, abdominal pain, diarrhea, constipation, dysuria, loss of appetite or weight loss. He denies neuropathy and he doesn't have bleeding or clotting issues. He denies any pain in his body. No anxiety or depression. The rest of the systems are unremarkable.     Vital Signs: /79 (Site: Right Upper Arm, Position: Sitting, Cuff Size: Medium Adult)   Pulse 86   Temp 97.6 °F (36.4 °C) (Infrared)   Ht 1.854 m (6' 1\")   Wt 104.3 kg (230 lb)   SpO2 97%   BMI 30.34 kg/m²      Physical Exam:  CONSTITUTIONAL: awake, alert, cooperative, no apparent distress   EYES: pupils equal, round and reactive to light, sclera clear, normal conjunctiva  ENT: Normocephalic, without obvious abnormality, atraumatic  NECK: supple, symmetrical, no jugular venous distension, no carotid bruits   HEMATOLOGIC/LYMPHATIC: no cervical, supraclavicular or axillary lymphadenopathy   LUNGS: VBS, no wheezes, no increased work of breathing, no rhonchi, clear to auscultation, no crackles,    CARDIOVASCULAR: regular rate and rhythm, normal S1 and S2, no murmur noted  ABDOMEN: normal bowel sounds x 4, soft, non-distended, non-tender, no masses palpated, no hepatosplenomegaly   MUSCULOSKELETAL: full range of motion noted, tone is normal  NEUROLOGIC: awake, alert, oriented to name, place and time. Motor skills grossly intact.   SKIN: appears intact, normal skin color, normal texture, normal turgor, no jaundice.   EXTREMITIES: no clubbing, RLE swelling +, no cyanosis,

## 2025-03-06 ENCOUNTER — CLINICAL DOCUMENTATION (OUTPATIENT)
Dept: ONCOLOGY | Age: 75
End: 2025-03-06

## 2025-03-06 NOTE — PROGRESS NOTES
Lab results from 03/05/2025 reviewed. PSA went up to 61.6   (was 31.40 4 months ago). Physician recommending that patient lease f/u with Dr. SIMIN styles. Called patient @ [phone] to notify. Voices understanding. No further needs addressed at this time.

## 2025-03-25 RX ORDER — DAROLUTAMIDE 300 MG/1
TABLET, FILM COATED ORAL
Qty: 120 TABLET | Refills: 3 | Status: ACTIVE | OUTPATIENT
Start: 2025-03-25

## 2025-04-15 ENCOUNTER — TRANSCRIBE ORDERS (OUTPATIENT)
Dept: ADMINISTRATIVE | Age: 75
End: 2025-04-15

## 2025-04-15 DIAGNOSIS — C61 PROSTATE CANCER (HCC): Primary | ICD-10-CM

## 2025-05-01 ENCOUNTER — HOSPITAL ENCOUNTER (OUTPATIENT)
Dept: PET IMAGING | Age: 75
Discharge: HOME | End: 2025-05-01
Attending: SPECIALIST
Payer: COMMERCIAL

## 2025-05-01 DIAGNOSIS — C61 PROSTATE CANCER (HCC): ICD-10-CM

## 2025-05-01 PROCEDURE — 2500000003 HC RX 250 WO HCPCS: Performed by: SPECIALIST

## 2025-05-01 PROCEDURE — 78815 PET IMAGE W/CT SKULL-THIGH: CPT

## 2025-05-01 RX ORDER — SODIUM CHLORIDE 0.9 % (FLUSH) 0.9 %
10 SYRINGE (ML) INJECTION PRN
Status: COMPLETED | OUTPATIENT
Start: 2025-05-01 | End: 2025-05-01

## 2025-05-01 RX ADMIN — SODIUM CHLORIDE, PRESERVATIVE FREE 10 ML: 5 INJECTION INTRAVENOUS at 10:44

## 2025-05-05 ENCOUNTER — OFFICE VISIT (OUTPATIENT)
Dept: ONCOLOGY | Age: 75
End: 2025-05-05
Payer: COMMERCIAL

## 2025-05-05 ENCOUNTER — HOSPITAL ENCOUNTER (OUTPATIENT)
Dept: INFUSION THERAPY | Age: 75
Discharge: HOME | End: 2025-05-05
Payer: COMMERCIAL

## 2025-05-05 VITALS
BODY MASS INDEX: 30.03 KG/M2 | HEART RATE: 86 BPM | SYSTOLIC BLOOD PRESSURE: 135 MMHG | HEIGHT: 73 IN | OXYGEN SATURATION: 94 % | TEMPERATURE: 97.7 F | WEIGHT: 226.6 LBS | DIASTOLIC BLOOD PRESSURE: 97 MMHG

## 2025-05-05 DIAGNOSIS — C79.51 PROSTATE CANCER METASTATIC TO BONE (HCC): Primary | ICD-10-CM

## 2025-05-05 DIAGNOSIS — C61 PROSTATE CANCER METASTATIC TO BONE (HCC): Primary | ICD-10-CM

## 2025-05-05 PROCEDURE — 1125F AMNT PAIN NOTED PAIN PRSNT: CPT | Performed by: INTERNAL MEDICINE

## 2025-05-05 PROCEDURE — 99214 OFFICE O/P EST MOD 30 MIN: CPT | Performed by: INTERNAL MEDICINE

## 2025-05-05 PROCEDURE — 1036F TOBACCO NON-USER: CPT | Performed by: INTERNAL MEDICINE

## 2025-05-05 PROCEDURE — 1159F MED LIST DOCD IN RCRD: CPT | Performed by: INTERNAL MEDICINE

## 2025-05-05 PROCEDURE — G8427 DOCREV CUR MEDS BY ELIG CLIN: HCPCS | Performed by: INTERNAL MEDICINE

## 2025-05-05 PROCEDURE — 1123F ACP DISCUSS/DSCN MKR DOCD: CPT | Performed by: INTERNAL MEDICINE

## 2025-05-05 PROCEDURE — G8417 CALC BMI ABV UP PARAM F/U: HCPCS | Performed by: INTERNAL MEDICINE

## 2025-05-05 PROCEDURE — 99212 OFFICE O/P EST SF 10 MIN: CPT

## 2025-05-05 PROCEDURE — 3017F COLORECTAL CA SCREEN DOC REV: CPT | Performed by: INTERNAL MEDICINE

## 2025-05-05 ASSESSMENT — PATIENT HEALTH QUESTIONNAIRE - PHQ9
1. LITTLE INTEREST OR PLEASURE IN DOING THINGS: NOT AT ALL
SUM OF ALL RESPONSES TO PHQ QUESTIONS 1-9: 0
2. FEELING DOWN, DEPRESSED OR HOPELESS: NOT AT ALL
SUM OF ALL RESPONSES TO PHQ QUESTIONS 1-9: 0

## 2025-06-10 NOTE — PROGRESS NOTES
Patient Name:  Stoney Morejon  Patient :  1950  Patient MRN:  [id number]     Primary Oncologist: Rolf Parr MD  Referring Provider: No primary care provider on file.     Date of Service: 2025     Chief Complaint:    Chief Complaint   Patient presents with    Follow-up     Patient's active problem list:        Metastatic prostate cancer    HPI:   Stoney Morejon is a 75-year-old very pleasant gentleman with medical history significant for hypertension, hyperlipidemia, COPD, GERD, osteoarthritis and history of kidney stone, initially referred to me on 2022 for evaluation of metastatic prostate cancer.     He initially presented to [facility] on 22 with abdominal pain and PVR was about 9 liter. CT scan on 22 showed cystitis, b/l moderate to severe HN, b/l renal stones.     All the renal stones are non obstructive and his ARF resolved with koch catheter.     He was also noted to have significant elevation in PSA (331 ng/ml on 22). It was 150 on 2022 and 5 on 2019. RACHEL at that time showed rock hard large prostate.     MRI pelvis showed diffuse tumor involvement of the prostate gland, with loss of zonal anatomy, extraprostatic extension and involvement of the seminal vesicles and suspected involvement of the neurovascular bundles, PI-RADS 5.  Enlarged pelvic and retroperitoneal lymph nodes consistent with willow metastasis.  A larger right common iliac chain lymph node results in mass-effect on the right common iliac vein.  Early mucosa enhancement of the urinary bladder.  Differential includes a cystitis although involvement of the urinary bladder is not excluded given extensive involvement of the prostate gland.    He has right lower extremity swelling. Doppler study was negative for DVT and Echo showed normal EF. RLE swelling is most likely due to mass effect from enlarged lymph node to right common iliac vein.     Bone scan done on 2022 showed findings concerning for metastatic disease

## 2025-06-16 ENCOUNTER — HOSPITAL ENCOUNTER (OUTPATIENT)
Dept: INFUSION THERAPY | Age: 75
Discharge: HOME | End: 2025-06-16
Payer: COMMERCIAL

## 2025-06-16 ENCOUNTER — OFFICE VISIT (OUTPATIENT)
Dept: ONCOLOGY | Age: 75
End: 2025-06-16
Payer: COMMERCIAL

## 2025-06-16 VITALS
OXYGEN SATURATION: 98 % | SYSTOLIC BLOOD PRESSURE: 149 MMHG | WEIGHT: 221 LBS | HEART RATE: 83 BPM | TEMPERATURE: 97.8 F | BODY MASS INDEX: 29.29 KG/M2 | DIASTOLIC BLOOD PRESSURE: 99 MMHG | HEIGHT: 73 IN

## 2025-06-16 DIAGNOSIS — C79.51 PROSTATE CANCER METASTATIC TO BONE (HCC): Primary | ICD-10-CM

## 2025-06-16 DIAGNOSIS — C61 PROSTATE CANCER METASTATIC TO BONE (HCC): Primary | ICD-10-CM

## 2025-06-16 PROCEDURE — 1159F MED LIST DOCD IN RCRD: CPT | Performed by: INTERNAL MEDICINE

## 2025-06-16 PROCEDURE — G8427 DOCREV CUR MEDS BY ELIG CLIN: HCPCS | Performed by: INTERNAL MEDICINE

## 2025-06-16 PROCEDURE — 3017F COLORECTAL CA SCREEN DOC REV: CPT | Performed by: INTERNAL MEDICINE

## 2025-06-16 PROCEDURE — 1036F TOBACCO NON-USER: CPT | Performed by: INTERNAL MEDICINE

## 2025-06-16 PROCEDURE — G8417 CALC BMI ABV UP PARAM F/U: HCPCS | Performed by: INTERNAL MEDICINE

## 2025-06-16 PROCEDURE — 1126F AMNT PAIN NOTED NONE PRSNT: CPT | Performed by: INTERNAL MEDICINE

## 2025-06-16 PROCEDURE — 99214 OFFICE O/P EST MOD 30 MIN: CPT | Performed by: INTERNAL MEDICINE

## 2025-06-16 PROCEDURE — 99212 OFFICE O/P EST SF 10 MIN: CPT

## 2025-06-16 PROCEDURE — 1123F ACP DISCUSS/DSCN MKR DOCD: CPT | Performed by: INTERNAL MEDICINE

## 2025-07-03 ENCOUNTER — TELEPHONE (OUTPATIENT)
Dept: ONCOLOGY | Age: 75
End: 2025-07-03

## 2025-07-03 NOTE — TELEPHONE ENCOUNTER
This RN reviewed with Dr. Parr, per Dr. Parr [facility] is holding Talzenna and medication is on hold as per [facility].     [facility] called and notified by this RN as per Dr. Parr that medication is on hold. [facility] is closing out referral at this time as next follow up with Dr. Parr is in 4 months and that is a longer window then they can leave this open.

## 2025-07-03 NOTE — TELEPHONE ENCOUNTER
----- Message from Juliane WILLAMS sent at 7/3/2025 10:28 AM EDT -----  [facility] m asking if this medication was still on hold or if they should resume. I can call them back if you'd like - just let me know what to tell them.   Thanks!!    [phone]

## 2025-07-24 ENCOUNTER — TELEPHONE (OUTPATIENT)
Dept: ONCOLOGY | Age: 75
End: 2025-07-24

## 2025-07-24 NOTE — TELEPHONE ENCOUNTER
[facility] called and lvm they would like clarification patient is holding tazenna at this time.

## 2025-07-25 ENCOUNTER — CLINICAL DOCUMENTATION (OUTPATIENT)
Dept: ONCOLOGY | Age: 75
End: 2025-07-25

## 2025-07-25 NOTE — TELEPHONE ENCOUNTER
Called [facility] @ [phone] to confirm that patient is holding talzenna at this time. [facility] voices understanding.